# Patient Record
Sex: FEMALE | Race: WHITE | NOT HISPANIC OR LATINO | Employment: FULL TIME | RURAL
[De-identification: names, ages, dates, MRNs, and addresses within clinical notes are randomized per-mention and may not be internally consistent; named-entity substitution may affect disease eponyms.]

---

## 2022-05-21 DIAGNOSIS — F51.01 PRIMARY INSOMNIA: Primary | ICD-10-CM

## 2022-05-23 RX ORDER — ZOLPIDEM TARTRATE 10 MG/1
TABLET ORAL
Qty: 30 TABLET | Refills: 2 | Status: SHIPPED | OUTPATIENT
Start: 2022-05-23 | End: 2022-10-31 | Stop reason: SDUPTHER

## 2022-05-23 NOTE — TELEPHONE ENCOUNTER
Rx Refill Note  Requested Prescriptions     Pending Prescriptions Disp Refills    zolpidem (AMBIEN) 10 MG tablet [Pharmacy Med Name: ZOLPIDEM 10MG TABLETS] 30 tablet      Sig: TAKE 1 TABLET BY MOUTH EVERY DAY AT BEDTIME      Last office visit with prescribing clinician: 3/14/22      Next office visit with prescribing clinician: Visit date not found            Gogo Dejesus MA  05/23/22, 11:27 EDT

## 2022-10-24 ENCOUNTER — TELEMEDICINE (OUTPATIENT)
Dept: FAMILY MEDICINE CLINIC | Facility: CLINIC | Age: 52
End: 2022-10-24

## 2022-10-24 VITALS — DIASTOLIC BLOOD PRESSURE: 84 MMHG | SYSTOLIC BLOOD PRESSURE: 130 MMHG

## 2022-10-24 DIAGNOSIS — Z79.899 HIGH RISK MEDICATION USE: ICD-10-CM

## 2022-10-24 DIAGNOSIS — F51.01 PRIMARY INSOMNIA: Primary | ICD-10-CM

## 2022-10-24 PROCEDURE — 99213 OFFICE O/P EST LOW 20 MIN: CPT | Performed by: PHYSICIAN ASSISTANT

## 2022-10-24 NOTE — PROGRESS NOTES
Chief Complaint  Med Refill (Ambien refills )    Subjective         Jc Little presents to [facility]  History of Present Illness  Visit done today through [email or web address], patient unable to access [employer]. Visit done with patient permission. She is in today for refills on Ambien- states does not have to take every evening. Denies side effects of medication. She states is utd on gyn exam, mammogram, colonoscopy and fasting labs. She states has been feeling well. Denies any chest pain or shortness of breath.   Insomnia  This is a chronic problem. Pertinent negatives include no abdominal pain, congestion, fatigue, headaches, myalgias, nausea, sore throat or vomiting.     Review of Systems   Constitutional: Negative for fatigue.   HENT: Negative for congestion and sore throat.    Respiratory: Negative for shortness of breath.    Gastrointestinal: Negative for abdominal pain, diarrhea, nausea and vomiting.   Musculoskeletal: Negative for myalgias.   Neurological: Negative for dizziness and headache.   Psychiatric/Behavioral: The patient has insomnia.      Objective   Vital Signs:   /84     Physical Exam   Constitutional: She appears well-developed and well-nourished.   Pulmonary/Chest: Effort normal.   Psychiatric: She has a normal mood and affect.     Result Review :                 Assessment and Plan    Diagnoses and all orders for this visit:    1. Primary insomnia (Primary)  Discussed limitation of virtual visit. Patient to come by for urine drug screen and will send medication refill to her regular PCP for review. Ari today shows compliance. RTC prior to recheck as needed.   2. High risk medication use  -     POC Urine Drug Screen, Confirmation        Follow Up   No follow-ups on file.  Patient was given instructions and counseling regarding her condition or for health maintenance advice. Please see specific information pulled into the AVS if appropriate.     Mode of Visit:  Video  Location of patient: home  Location of provider: [facility] clinic  You have chosen to receive care through a telehealth visit.  Does the patient consent to use a video/audio connection for your medical care today? Yes  The visit included audio and video interaction. No technical issues occurred during this visit.

## 2022-10-27 ENCOUNTER — CLINICAL SUPPORT (OUTPATIENT)
Dept: FAMILY MEDICINE CLINIC | Facility: CLINIC | Age: 52
End: 2022-10-27

## 2022-10-27 ENCOUNTER — TELEPHONE (OUTPATIENT)
Dept: FAMILY MEDICINE CLINIC | Facility: CLINIC | Age: 52
End: 2022-10-27

## 2022-10-27 DIAGNOSIS — Z79.899 HIGH RISK MEDICATION USE: ICD-10-CM

## 2022-10-27 LAB
POC AMPHETAMINES: NEGATIVE
POC BARBITURATES: NEGATIVE
POC BENZODIAZEPHINES: NEGATIVE
POC COCAINE: NEGATIVE
POC METHADONE: NEGATIVE
POC METHAMPHETAMINE SCREEN URINE: NEGATIVE
POC OPIATES: NEGATIVE
POC OXYCODONE: NEGATIVE
POC PHENCYCLIDINE: NEGATIVE
POC PROPOXYPHENE: NEGATIVE
POC THC: NEGATIVE
POC TRICYCLIC ANTIDEPRESSANTS: NEGATIVE

## 2022-10-27 PROCEDURE — 80305 DRUG TEST PRSMV DIR OPT OBS: CPT | Performed by: PHYSICIAN ASSISTANT

## 2022-10-27 NOTE — TELEPHONE ENCOUNTER
Patient seen on 10/24/2022 for  telehealth visit and [facility] UDS was negative. Ari printed. Thanks.

## 2022-10-31 ENCOUNTER — TELEPHONE (OUTPATIENT)
Dept: FAMILY MEDICINE CLINIC | Facility: CLINIC | Age: 52
End: 2022-10-31

## 2022-10-31 DIAGNOSIS — F51.01 PRIMARY INSOMNIA: ICD-10-CM

## 2022-10-31 RX ORDER — ZOLPIDEM TARTRATE 10 MG/1
10 TABLET ORAL
Qty: 30 TABLET | Refills: 2 | Status: SHIPPED | OUTPATIENT
Start: 2022-10-31 | End: 2023-02-08

## 2022-10-31 RX ORDER — ZOLPIDEM TARTRATE 10 MG/1
10 TABLET ORAL
Qty: 30 TABLET | Refills: 2 | Status: SHIPPED | OUTPATIENT
Start: 2022-10-31 | End: 2022-10-31 | Stop reason: SDUPTHER

## 2022-11-01 NOTE — TELEPHONE ENCOUNTER
Rx Refill Note  Requested Prescriptions     Pending Prescriptions Disp Refills    zolpidem (AMBIEN) 10 MG tablet [Pharmacy Med Name: ZOLPIDEM 10MG TABLETS] 30 tablet      Sig: TAKE 1 TABLET BY MOUTH EVERY DAY AT BEDTIME      Last office visit with prescribing clinician: Visit date not found      Next office visit with prescribing clinician: Visit date not found            Gogo Dejesus MA  11/01/22, 16:21 EDT

## 2022-11-02 RX ORDER — ZOLPIDEM TARTRATE 10 MG/1
TABLET ORAL
Qty: 30 TABLET | Refills: 0 | Status: SHIPPED | OUTPATIENT
Start: 2022-11-02 | End: 2023-03-13

## 2023-02-08 ENCOUNTER — OFFICE VISIT (OUTPATIENT)
Dept: FAMILY MEDICINE CLINIC | Facility: CLINIC | Age: 53
End: 2023-02-08
Payer: COMMERCIAL

## 2023-02-08 VITALS
WEIGHT: 222.19 LBS | BODY MASS INDEX: 34.87 KG/M2 | DIASTOLIC BLOOD PRESSURE: 82 MMHG | HEART RATE: 79 BPM | SYSTOLIC BLOOD PRESSURE: 120 MMHG | TEMPERATURE: 98.1 F | HEIGHT: 67 IN | OXYGEN SATURATION: 98 %

## 2023-02-08 DIAGNOSIS — M79.605 LEFT LEG PAIN: Primary | ICD-10-CM

## 2023-02-08 PROCEDURE — 99213 OFFICE O/P EST LOW 20 MIN: CPT | Performed by: NURSE PRACTITIONER

## 2023-02-08 RX ORDER — CEPHALEXIN 500 MG/1
500 CAPSULE ORAL 3 TIMES DAILY
Qty: 30 CAPSULE | Refills: 0 | Status: SHIPPED | OUTPATIENT
Start: 2023-02-08

## 2023-02-08 NOTE — ASSESSMENT & PLAN NOTE
Patient will return this afternoon for x-ray as our x-ray tech is not currently here.  Informed her to call afterwards for results.  Informed her that the cut has likely become infected which is causing the redness and the pain in the area so we will treat with a course of Keflex.  Keep clean with soap and water.  Tylenol and ibuprofen as needed pain fever.  Education provided.  Risk of meds discussed understood.  Go to ED if worsens.  Return in 2 days if no improvement, to ED if worsens.  Return to clinic or ED with any issues or concerns.

## 2023-02-08 NOTE — PROGRESS NOTES
"Chief Complaint  Left Leg injury    Subjective          Jc Little presents to [facility]  History of Present Illness     Patient states 5 days ago she was stepping up on her deck and tripped and hit her left shin on the corner of a wooden stair.  States there are 3 cuts, bruising present, and an area that is reddened and warm and tender.  No discharge.  No fever no chills no body aches.  No calf pain no leg swelling.  No shortness of breath no trouble breathing no chest pain no chest pressure.  States she had a tetanus shot 2 years ago.    Objective   Vital Signs:   /82   Pulse 79   Temp 98.1 °F (36.7 °C)   Ht 170.2 cm (67\")   Wt 101 kg (222 lb 3 oz)   SpO2 98%   BMI 34.80 kg/m²     Body mass index is 34.8 kg/m².    Review of Systems   Constitutional: Negative for chills, fatigue and fever.   Respiratory: Negative for cough, shortness of breath and wheezing.    Cardiovascular: Negative for leg swelling.   Skin: Positive for wound.   Neurological: Negative for dizziness, light-headedness and headache.       Past History:  Medical History: has a past medical history of Diverticulitis (2007) and Pregnancy.   Surgical History: has no past surgical history on file.   Family History: family history includes Alzheimer's disease in an other family member; Cancer in an other family member; Diabetes in an other family member; Hypertension in her mother; Obesity in her mother; Peripheral vascular disease in her mother.   Social History: reports that she has quit smoking. Her smoking use included cigarettes. She has never used smokeless tobacco. She reports current alcohol use of about 10.0 standard drinks per week. She reports that she does not use drugs.    PHQ-2 Depression Screening  Little interest or pleasure in doing things? 0-->not at all   Feeling down, depressed, or hopeless? 0-->not at all   PHQ-2 Total Score 0        PHQ-9 Depression Screening  Little interest or pleasure " in doing things? 0-->not at all   Feeling down, depressed, or hopeless? 0-->not at all   Trouble falling or staying asleep, or sleeping too much?     Feeling tired or having little energy?     Poor appetite or overeating?     Feeling bad about yourself - or that you are a failure or have let yourself or your family down?     Trouble concentrating on things, such as reading the newspaper or watching television?     Moving or speaking so slowly that other people could have noticed? Or the opposite - being so fidgety or restless that you have been moving around a lot more than usual?     Thoughts that you would be better off dead, or of hurting yourself in some way?     PHQ-9 Total Score 0   If you checked off any problems, how difficult have these problems made it for you to do your work, take care of things at home, or get along with other people?       PHQ-9 Total Score: 0      Patient screened positive for depression based on a PHQ-9 score of 0 on 2/8/2023. Follow-up recommendations include:       Current Outpatient Medications:   •  zolpidem (AMBIEN) 10 MG tablet, TAKE 1 TABLET BY MOUTH EVERY DAY AT BEDTIME, Disp: 30 tablet, Rfl: 0  •  cephalexin (Keflex) 500 MG capsule, Take 1 capsule by mouth 3 (Three) Times a Day., Disp: 30 capsule, Rfl: 0   (Not in a hospital admission)     Allergies: Patient has no known allergies.    Physical Exam  Constitutional:       Appearance: Normal appearance.   Cardiovascular:      Rate and Rhythm: Normal rate and regular rhythm.      Heart sounds: Normal heart sounds.   Pulmonary:      Effort: Pulmonary effort is normal.      Breath sounds: Normal breath sounds.   Musculoskeletal:         General: No swelling.      Right lower leg: Laceration present. No edema.      Left lower leg: No edema.        Legs:    Skin:         Neurological:      General: No focal deficit present.      Mental Status: She is alert and oriented to person, place, and time. Mental status is at baseline.    Psychiatric:         Mood and Affect: Mood normal.         Behavior: Behavior normal.         Thought Content: Thought content normal.         Judgment: Judgment normal.          Result Review :                   Assessment and Plan    Diagnoses and all orders for this visit:    1. Left leg pain (Primary)  Assessment & Plan:  Patient will return this afternoon for x-ray as our x-ray tech is not currently here.  Informed her to call afterwards for results.  Informed her that the cut has likely become infected which is causing the redness and the pain in the area so we will treat with a course of Keflex.  Keep clean with soap and water.  Tylenol and ibuprofen as needed pain fever.  Education provided.  Risk of meds discussed understood.  Go to ED if worsens.  Return in 2 days if no improvement, to ED if worsens.  Return to clinic or ED with any issues or concerns.    Orders:  -     XR Tibia Fibula 2 View Left (In Office)  -     cephalexin (Keflex) 500 MG capsule; Take 1 capsule by mouth 3 (Three) Times a Day.  Dispense: 30 capsule; Refill: 0            BMI is >= 30 and <35. (Class 1 Obesity). The following options were offered after discussion;: exercise counseling/recommendations and nutrition counseling/recommendations       Follow Up   Return if symptoms worsen or fail to improve.  Patient was given instructions and counseling regarding her condition or for health maintenance advice. Please see specific information pulled into the AVS if appropriate.     FATOU Swift

## 2023-02-09 ENCOUNTER — TELEPHONE (OUTPATIENT)
Dept: FAMILY MEDICINE CLINIC | Facility: CLINIC | Age: 53
End: 2023-02-09
Payer: COMMERCIAL

## 2023-02-09 NOTE — TELEPHONE ENCOUNTER
Please let patient know that from what I can see on the x-ray it looks unremarkable, no fracture.  I am still waiting for the official report though to come back from the radiologist so I will keep her updated.  Thanks

## 2023-02-09 NOTE — TELEPHONE ENCOUNTER
Caller: Jc Little    Relationship: Self    Best call back number: [phone]    Caller requesting test results: YES    What test was performed: X-RAY    When was the test performed: 2.8.23    Where was the test performed: IN OFFICE    Additional notes: PLEASE CALL TO DISCUSS THESE RESULTS AS SOON AS POSSIBLE.     THANK YOU.

## 2023-02-13 DIAGNOSIS — M79.605 LEFT LEG PAIN: Primary | ICD-10-CM

## 2023-02-17 ENCOUNTER — TELEPHONE (OUTPATIENT)
Dept: FAMILY MEDICINE CLINIC | Facility: CLINIC | Age: 53
End: 2023-02-17
Payer: COMMERCIAL

## 2023-02-17 NOTE — TELEPHONE ENCOUNTER
Caller: Jc Little    Relationship: Self    Best call back number: [phone]    What medication are you requesting: AN ADDITIONAL ANTIBIOTIC     What are your current symptoms: INFECTION IN LEFT LEG FROM FALL, STILL RED,SWOLLEN AND HOT     How long have you been experiencing symptoms: 2 WEEKS    Have you had these symptoms before:    [x] Yes  [] No    Have you been treated for these symptoms before:   [x] Yes  [] No    If a prescription is needed, what is your preferred pharmacy and phone number: [facility][id number] [address] AT [facility] & [facility] - [phone] [facility] [phone]      Additional notes:  THE PATIENT REPORTS THAT SHE WILL BE OUT OF HER CURRENT ANTIBOTICS FOR THE INFECTION IN HER LEG AS OF TOMORROW (02/18/2023), AND STATES IT HAS NOT GOTTEN ANY BETTER AND PERHAPS HAS GOTTEN WORSE. THE PATIENT REPORTS SHE HAS AN UPCOMING APPOINTMENT WITH ORTHO ON Tuesday, 02/21/2023.    PLEASE CALL THE PATIENT AND ADVISE

## 2023-02-18 NOTE — TELEPHONE ENCOUNTER
Called pt and let her know we should make appt to reevaluate. She declines appt. Will call back if it gets worse.

## 2023-02-22 ENCOUNTER — TELEPHONE (OUTPATIENT)
Dept: FAMILY MEDICINE CLINIC | Facility: CLINIC | Age: 53
End: 2023-02-22
Payer: COMMERCIAL

## 2023-02-22 NOTE — TELEPHONE ENCOUNTER
Caller: Jc Bautista    Relationship: Self    Best call back number: [phone]    What form or medical record are you requesting: XRAY RESULTS DISK    Who is requesting this form or medical record from you: [facility]    How would you like to receive the form or medical records (pick-up, mail, fax): PT WILL LIKE TO HAVE DISK PICKED UP FROM OFFICE. PT WILL LIKE FOR   DISK: CELSO BAUTISTA    Timeframe paperwork needed: ASAP, APPT WITH [facility] IS Friday 2/24/2023

## 2023-03-10 DIAGNOSIS — F51.01 PRIMARY INSOMNIA: ICD-10-CM

## 2023-03-13 RX ORDER — ZOLPIDEM TARTRATE 10 MG/1
TABLET ORAL
Qty: 30 TABLET | Refills: 0 | Status: SHIPPED | OUTPATIENT
Start: 2023-03-13

## 2023-03-13 NOTE — TELEPHONE ENCOUNTER
Caller: Jc Little    Relationship: Self    Best call back number: [phone]    Requested Prescriptions:   Requested Prescriptions     Pending Prescriptions Disp Refills   • zolpidem (AMBIEN) 10 MG tablet [Pharmacy Med Name: ZOLPIDEM 10MG TABLETS] 30 tablet      Sig: TAKE 1 TABLET BY MOUTH EVERY DAY AT BEDTIME        Pharmacy where request should be sent: [facility] #[id number] [address] AT [facility] & [facility] [phone] [facility] [phone] [facility]       Does the patient have less than a 3 day supply:  [x] Yes  [] No    Would you like a call back once the refill request has been completed: [x] Yes [] No    If the office needs to give you a call back, can they leave a voicemail: [x] Yes [] No    Drea Jameson Rep   03/13/23 13:59 EDT

## 2023-04-11 ENCOUNTER — OFFICE VISIT (OUTPATIENT)
Dept: FAMILY MEDICINE CLINIC | Facility: CLINIC | Age: 53
End: 2023-04-11
Payer: COMMERCIAL

## 2023-04-11 VITALS
BODY MASS INDEX: 34.69 KG/M2 | WEIGHT: 221 LBS | HEART RATE: 75 BPM | SYSTOLIC BLOOD PRESSURE: 118 MMHG | DIASTOLIC BLOOD PRESSURE: 80 MMHG | HEIGHT: 67 IN | OXYGEN SATURATION: 99 %

## 2023-04-11 DIAGNOSIS — M25.50 ARTHRALGIA, UNSPECIFIED JOINT: Primary | ICD-10-CM

## 2023-04-11 DIAGNOSIS — F51.01 PRIMARY INSOMNIA: ICD-10-CM

## 2023-04-11 RX ORDER — MELOXICAM 7.5 MG/1
7.5 TABLET ORAL DAILY
Qty: 30 TABLET | Refills: 2 | Status: SHIPPED | OUTPATIENT
Start: 2023-04-11

## 2023-04-11 RX ORDER — ZOLPIDEM TARTRATE 10 MG/1
10 TABLET ORAL
Qty: 30 TABLET | Refills: 5 | Status: SHIPPED | OUTPATIENT
Start: 2023-04-11

## 2023-04-12 NOTE — PROGRESS NOTES
Follow Up Office Visit      Date of Visit:  2023   Patient Name: Jc Little  : 1970   MRN: [id number]     Chief Complaint:    Chief Complaint   Patient presents with   • Insomnia       History of Present Illness: Jc Little is a 52 y.o. female who is here today for follow up.  Patient coming in to review recheck insomnia.  On Ambien.  Controlled.  Ari report appropriate.  Patient also with some arthralgias that she is struggling with.        Subjective      Review of Systems:   Review of Systems   Constitutional: Negative for fatigue and fever.   HENT: Negative for congestion and ear pain.    Respiratory: Negative for apnea, cough, chest tightness and shortness of breath.    Cardiovascular: Negative for chest pain.   Gastrointestinal: Negative for abdominal pain, constipation, diarrhea and nausea.   Musculoskeletal: Negative for arthralgias.   Psychiatric/Behavioral: Negative for depressed mood and stress.       Past Medical History:   Past Medical History:   Diagnosis Date   • Diverticulitis     DX'ED BY ER MD THOUGH TOLD CT WAS OK   • Pregnancy        Past Surgical History: No past surgical history on file.    Family History:   Family History   Problem Relation Age of Onset   • Hypertension Mother    • Obesity Mother    • Peripheral vascular disease Mother    • Alzheimer's disease Other    • Cancer Other    • Diabetes Other        Social History:   Social History     Socioeconomic History   • Marital status:    Tobacco Use   • Smoking status: Former     Years: 10.00     Types: Cigarettes   • Smokeless tobacco: Never   Vaping Use   • Vaping Use: Never used   Substance and Sexual Activity   • Alcohol use: Yes     Alcohol/week: 10.0 standard drinks     Types: 10 Cans of beer per week   • Drug use: Never   • Sexual activity: Defer       Medications:     Current Outpatient Medications:   •  zolpidem (AMBIEN) 10 MG tablet, Take 1 tablet by mouth every night at bedtime., Disp: 30  "tablet, Rfl: 5  •  meloxicam (Mobic) 7.5 MG tablet, Take 1 tablet by mouth Daily., Disp: 30 tablet, Rfl: 2    Allergies:   No Known Allergies    Objective     Physical Exam:  Vital Signs:   Vitals:    04/11/23 1420   BP: 118/80   Pulse: 75   SpO2: 99%   Weight: 100 kg (221 lb)   Height: 170.2 cm (67\")     Body mass index is 34.61 kg/m².     Physical Exam  Vitals and nursing note reviewed.   Constitutional:       General: She is not in acute distress.     Appearance: Normal appearance. She is not ill-appearing.   HENT:      Head: Normocephalic and atraumatic.      Right Ear: Tympanic membrane and ear canal normal.      Left Ear: Tympanic membrane and ear canal normal.      Nose: Nose normal.   Cardiovascular:      Rate and Rhythm: Normal rate and regular rhythm.      Heart sounds: Normal heart sounds.   Pulmonary:      Effort: Pulmonary effort is normal.      Breath sounds: Normal breath sounds.   Neurological:      Mental Status: She is alert and oriented to person, place, and time. Mental status is at baseline.   Psychiatric:         Mood and Affect: Mood normal.         Procedures      Assessment / Plan      Assessment/Plan:   Diagnoses and all orders for this visit:    1. Arthralgia, unspecified joint (Primary)    2. Primary insomnia  -     zolpidem (AMBIEN) 10 MG tablet; Take 1 tablet by mouth every night at bedtime.  Dispense: 30 tablet; Refill: 5    Other orders  -     meloxicam (Mobic) 7.5 MG tablet; Take 1 tablet by mouth Daily.  Dispense: 30 tablet; Refill: 2         Refill medication for insomnia.  Trial of meloxicam for some arthralgias.    Follow Up:   No follow-ups on file.    [facility] [place]   "

## 2023-09-29 ENCOUNTER — OFFICE VISIT (OUTPATIENT)
Dept: FAMILY MEDICINE CLINIC | Facility: CLINIC | Age: 53
End: 2023-09-29
Payer: COMMERCIAL

## 2023-09-29 VITALS
DIASTOLIC BLOOD PRESSURE: 80 MMHG | SYSTOLIC BLOOD PRESSURE: 112 MMHG | HEIGHT: 67 IN | BODY MASS INDEX: 33.12 KG/M2 | HEART RATE: 59 BPM | OXYGEN SATURATION: 100 % | WEIGHT: 211 LBS

## 2023-09-29 DIAGNOSIS — M25.561 CHRONIC PAIN OF BOTH KNEES: ICD-10-CM

## 2023-09-29 DIAGNOSIS — M25.562 CHRONIC PAIN OF BOTH KNEES: ICD-10-CM

## 2023-09-29 DIAGNOSIS — M25.511 ACUTE PAIN OF RIGHT SHOULDER: Primary | ICD-10-CM

## 2023-09-29 DIAGNOSIS — G89.29 CHRONIC PAIN OF BOTH KNEES: ICD-10-CM

## 2023-09-29 PROCEDURE — 99213 OFFICE O/P EST LOW 20 MIN: CPT | Performed by: PHYSICIAN ASSISTANT

## 2023-09-29 NOTE — PROGRESS NOTES
"Chief Complaint  Knee Pain (Both knees having pain in them for couple months ) and Shoulder Pain (Right shoulder pain for couple months )    Subjective          Jc Little presents to [facility]  History of Present Illness  Patient in today for evaluation on bilateral knee pain that she states has been ongoing for several months but seems to be getting worse, right knee worse than left. States gets stiffness and fullness feeling to knees. She also states around 2 months ago woke up with pain to right shoulder- no known injury, but has limited her ROM at times and is modifying how she does things and wanted to get checked. She tries not to take nsaids due to history of gastric bypass surgery.   Knee Pain   The incident occurred more than 1 week ago. There was no injury mechanism. The pain is present in the left knee and right knee. The quality of the pain is described as aching.     Objective   Vital Signs:   /80   Pulse 59   Ht 170.2 cm (67\")   Wt 95.7 kg (211 lb)   SpO2 100%   BMI 33.05 kg/m²     Body mass index is 33.05 kg/m².    Review of Systems   Constitutional:  Negative for fever.   Musculoskeletal:  Positive for arthralgias and joint swelling.     Past History:  Medical History: has a past medical history of Diverticulitis (2007) and Pregnancy.   Surgical History: has no past surgical history on file.   Family History: family history includes Alzheimer's disease in an other family member; Cancer in an other family member; Diabetes in an other family member; Hypertension in her mother; Obesity in her mother; Peripheral vascular disease in her mother.   Social History: reports that she has quit smoking. Her smoking use included cigarettes. She has never used smokeless tobacco. She reports current alcohol use of about 10.0 standard drinks per week. She reports that she does not use drugs.      Current Outpatient Medications:     zolpidem (AMBIEN) 10 MG tablet, Take 1 " tablet by mouth every night at bedtime., Disp: 30 tablet, Rfl: 5    meloxicam (Mobic) 7.5 MG tablet, Take 1 tablet by mouth Daily., Disp: 30 tablet, Rfl: 2  Allergies: Patient has no known allergies.    Physical Exam  Constitutional:       Appearance: Normal appearance.   Musculoskeletal:      Comments: FROM of bilateral knee; mild tenderness to palpate bilaterally; FROM of right shoulder; tenderness noted with adduction of shoulder; able to reach behind back    Neurological:      Mental Status: She is alert and oriented to person, place, and time.   Psychiatric:         Mood and Affect: Mood normal.         Behavior: Behavior normal.           Assessment and Plan   Diagnoses and all orders for this visit:    1. Acute pain of right shoulder (Primary)  -     XR Shoulder 2+ View Right (In Office)  Will check xray of shoulder and discussed can set up with physical therapy for consult and/or orthopedist if needed.   2. Chronic pain of both knees  -     XR Knee 1 or 2 View Bilateral (In Office)  Will check xrays of knees today and discussed can get in with ortho if needed.           Follow Up   No follow-ups on file.  Patient was given instructions and counseling regarding her condition or for health maintenance advice. Please see specific information pulled into the AVS if appropriate.     Ciera Casillas PA-C

## 2023-10-05 DIAGNOSIS — G89.29 CHRONIC PAIN OF RIGHT KNEE: Primary | ICD-10-CM

## 2023-10-05 DIAGNOSIS — M25.561 CHRONIC PAIN OF RIGHT KNEE: Primary | ICD-10-CM

## 2023-10-12 ENCOUNTER — OFFICE VISIT (OUTPATIENT)
Dept: FAMILY MEDICINE CLINIC | Facility: CLINIC | Age: 53
End: 2023-10-12
Payer: COMMERCIAL

## 2023-10-12 VITALS
WEIGHT: 209 LBS | HEART RATE: 72 BPM | HEIGHT: 67 IN | SYSTOLIC BLOOD PRESSURE: 118 MMHG | DIASTOLIC BLOOD PRESSURE: 80 MMHG | OXYGEN SATURATION: 98 % | BODY MASS INDEX: 32.8 KG/M2

## 2023-10-12 DIAGNOSIS — J02.9 SORE THROAT: Primary | ICD-10-CM

## 2023-10-12 DIAGNOSIS — H65.01 RIGHT ACUTE SEROUS OTITIS MEDIA, RECURRENCE NOT SPECIFIED: ICD-10-CM

## 2023-10-12 LAB
EXPIRATION DATE: NORMAL
EXPIRATION DATE: NORMAL
FLUAV AG UPPER RESP QL IA.RAPID: NOT DETECTED
FLUBV AG UPPER RESP QL IA.RAPID: NOT DETECTED
INTERNAL CONTROL: NORMAL
INTERNAL CONTROL: NORMAL
Lab: NORMAL
Lab: NORMAL
S PYO AG THROAT QL: NEGATIVE
SARS-COV-2 AG UPPER RESP QL IA.RAPID: NOT DETECTED

## 2023-10-12 PROCEDURE — 87880 STREP A ASSAY W/OPTIC: CPT | Performed by: FAMILY MEDICINE

## 2023-10-12 PROCEDURE — 99213 OFFICE O/P EST LOW 20 MIN: CPT | Performed by: FAMILY MEDICINE

## 2023-10-12 PROCEDURE — 87428 SARSCOV & INF VIR A&B AG IA: CPT | Performed by: FAMILY MEDICINE

## 2023-10-12 RX ORDER — FEXOFENADINE HCL 180 MG/1
180 TABLET ORAL DAILY
Qty: 30 TABLET | Refills: 11 | Status: SHIPPED | OUTPATIENT
Start: 2023-10-12

## 2023-10-12 RX ORDER — AMOXICILLIN 500 MG/1
1000 CAPSULE ORAL 2 TIMES DAILY
Qty: 40 CAPSULE | Refills: 0 | Status: SHIPPED | OUTPATIENT
Start: 2023-10-12

## 2023-10-12 RX ORDER — FLUTICASONE PROPIONATE 50 MCG
2 SPRAY, SUSPENSION (ML) NASAL DAILY
Qty: 16 G | Refills: 11 | Status: SHIPPED | OUTPATIENT
Start: 2023-10-12

## 2023-10-12 NOTE — PROGRESS NOTES
Office Note     Name: Jc Little    : 1970     MRN: [id number]     Chief Complaint  Sinusitis (With ear ache and chills and sore throat. )    Subjective     History of Present Illness:  Jc Little is a 53 y.o. female who presents today for with 2 days of earache and chills and sore throat.  She gets 1 infection sometime during the year.  She seems congested especially when she lays down at night total blockage.    Review of Systems:   Review of Systems    Past Medical History:   Past Medical History:   Diagnosis Date    Diverticulitis     DX'ED BY ER MD THOUGH TOLD CT WAS OK    Pregnancy        Past Surgical History: History reviewed. No pertinent surgical history.    Family History:   Family History   Problem Relation Age of Onset    Hypertension Mother     Obesity Mother     Peripheral vascular disease Mother     Alzheimer's disease Other     Cancer Other     Diabetes Other        Social History:   Social History     Socioeconomic History    Marital status:    Tobacco Use    Smoking status: Former     Years: 10     Types: Cigarettes    Smokeless tobacco: Never   Vaping Use    Vaping Use: Never used   Substance and Sexual Activity    Alcohol use: Yes     Alcohol/week: 10.0 standard drinks of alcohol     Types: 10 Cans of beer per week    Drug use: Never    Sexual activity: Defer       Immunizations:   Immunization History   Administered Date(s) Administered    COVID-19 (MODERNA) 1st,2nd,3rd Dose Monovalent 2021, 2021    Hepatitis A 2018, 2019        Medications:     Current Outpatient Medications:     zolpidem (AMBIEN) 10 MG tablet, Take 1 tablet by mouth every night at bedtime., Disp: 30 tablet, Rfl: 5    amoxicillin (AMOXIL) 500 MG capsule, Take 2 capsules by mouth 2 (Two) Times a Day., Disp: 40 capsule, Rfl: 0    fexofenadine (Allegra Allergy) 180 MG tablet, Take 1 tablet by mouth Daily., Disp: 30 tablet, Rfl: 11    fluticasone (FLONASE) 50 MCG/ACT nasal spray,  "2 sprays into the nostril(s) as directed by provider Daily., Disp: 16 g, Rfl: 11    Allergies:   No Known Allergies    Objective     Vital Signs  /80   Pulse 72   Ht 170.2 cm (67.01\")   Wt 94.8 kg (209 lb)   SpO2 98%   BMI 32.73 kg/mý   Estimated body mass index is 32.73 kg/mý as calculated from the following:    Height as of this encounter: 170.2 cm (67.01\").    Weight as of this encounter: 94.8 kg (209 lb).            Physical Exam  Vitals and nursing note reviewed.   HENT:      Head: Normocephalic and atraumatic.      Comments: Rt tm 3+ redness, bulging>     Right Ear: Ear canal and external ear normal.      Left Ear: Tympanic membrane, ear canal and external ear normal.      Nose: No congestion or rhinorrhea.      Mouth/Throat:      Mouth: Mucous membranes are moist. Mucous membranes are dry.   Eyes:      Extraocular Movements: Extraocular movements intact.      Conjunctiva/sclera: Conjunctivae normal.      Pupils: Pupils are equal, round, and reactive to light.   Cardiovascular:      Rate and Rhythm: Normal rate and regular rhythm.   Pulmonary:      Effort: Pulmonary effort is normal.      Breath sounds: Normal breath sounds.   Lymphadenopathy:      Cervical: No cervical adenopathy.   Skin:     General: Skin is warm and dry.   Neurological:      General: No focal deficit present.      Mental Status: She is alert.        Procedures     Assessment and Plan     1. Sore throat  Rapid strep negative Abdi and COVID-19 negative, influenza A influenza B negative  - POCT SARS-CoV-2 Antigen VANNESSA  - POC Rapid Strep A    2. Right acute serous otitis media, recurrence not specified  Amoxil high-dose Flonase and left leg are recommended       Follow Up  No follow-ups on file.    Lauri BACK [facility]  [address] [facility] [address] [phone]  "

## 2023-11-28 DIAGNOSIS — F51.01 PRIMARY INSOMNIA: ICD-10-CM

## 2023-11-28 NOTE — TELEPHONE ENCOUNTER
Caller: Jc Little    Relationship: Self    Best call back number: [phone]     Requested Prescriptions:   Requested Prescriptions     Pending Prescriptions Disp Refills    zolpidem (AMBIEN) 10 MG tablet [Pharmacy Med Name: ZOLPIDEM 10MG TABLETS] 30 tablet      Sig: TAKE 1 TABLET BY MOUTH EVERY NIGHT AT BEDTIME        Pharmacy where request should be sent: [id number] [place] [address] AT [facility] [address] [facility] [phone] [facility] [phone]      Last office visit with prescribing clinician: 4/11/2023   Last telemedicine visit with prescribing clinician: Visit date not found   Next office visit with prescribing clinician: Visit date not found     Additional details provided by patient: HAS 2 PILLS LEFT.    Does the patient have less than a 3 day supply:  [x] Yes  [] No    Would you like a call back once the refill request has been completed: [x] Yes [] No    If the office needs to give you a call back, can they leave a voicemail: [] Yes [] No    Drea Porter Rep   11/28/23 11:27 EST

## 2023-11-29 RX ORDER — ZOLPIDEM TARTRATE 10 MG/1
10 TABLET ORAL
Qty: 30 TABLET | OUTPATIENT
Start: 2023-11-29

## 2023-11-30 NOTE — TELEPHONE ENCOUNTER
PATIENT ICALLED BACK TO CHECK THE STATUS OF THIS MEDICATION REQUEST. I SCHEDULED HER ON 12/06/23 AT 8:15AM. CAN YOU PLEASE SEND IN A WEEKS WORTH OF AMBIEN TO GET HER THROUGH UNTIL THEN. CANNOT SLEEP WITHOUT IT SOMETIMES.  PLEASE SEND TO [facility] IN [place]. THANK YOU.

## 2023-12-06 ENCOUNTER — TELEPHONE (OUTPATIENT)
Dept: FAMILY MEDICINE CLINIC | Facility: CLINIC | Age: 53
End: 2023-12-06
Payer: COMMERCIAL

## 2023-12-06 ENCOUNTER — OFFICE VISIT (OUTPATIENT)
Dept: FAMILY MEDICINE CLINIC | Facility: CLINIC | Age: 53
End: 2023-12-06
Payer: COMMERCIAL

## 2023-12-06 VITALS
WEIGHT: 209 LBS | DIASTOLIC BLOOD PRESSURE: 82 MMHG | HEART RATE: 62 BPM | SYSTOLIC BLOOD PRESSURE: 118 MMHG | HEIGHT: 67 IN | BODY MASS INDEX: 32.8 KG/M2 | OXYGEN SATURATION: 96 %

## 2023-12-06 DIAGNOSIS — Z79.899 ENCOUNTER FOR LONG-TERM (CURRENT) USE OF OTHER MEDICATIONS: ICD-10-CM

## 2023-12-06 DIAGNOSIS — F51.01 PRIMARY INSOMNIA: ICD-10-CM

## 2023-12-06 DIAGNOSIS — F51.01 PRIMARY INSOMNIA: Primary | ICD-10-CM

## 2023-12-06 PROCEDURE — 99213 OFFICE O/P EST LOW 20 MIN: CPT | Performed by: PHYSICIAN ASSISTANT

## 2023-12-06 PROCEDURE — 80305 DRUG TEST PRSMV DIR OPT OBS: CPT | Performed by: PHYSICIAN ASSISTANT

## 2023-12-06 RX ORDER — ZOLPIDEM TARTRATE 10 MG/1
10 TABLET ORAL
Qty: 30 TABLET | Refills: 5 | Status: SHIPPED | OUTPATIENT
Start: 2023-12-06

## 2023-12-06 NOTE — PROGRESS NOTES
"Chief Complaint  Insomnia (Ambien refills. )    Subjective          Jc Little presents to [facility]  History of Present Illness  Patient in today for medication refill on Ambien for her insomnia. She states does not have to take every night  and sometimes only takes half a pill. She denies any side effects of medications. She is utd on gyn exam and mammogram at this time. She is utd on colonoscopy. She states gets labs through her gyn annually and will bring in a copy of next labs.   Insomnia  This is a chronic problem. Pertinent negatives include no abdominal pain, chest pain, congestion, coughing, fever, myalgias, nausea, sore throat or vomiting.       Objective   Vital Signs:   /82   Pulse 62   Ht 170.2 cm (67\")   Wt 94.8 kg (209 lb)   SpO2 96%   BMI 32.73 kg/m²     Body mass index is 32.73 kg/m².    Review of Systems   Constitutional:  Negative for fever.   HENT:  Negative for congestion and sore throat.    Respiratory:  Negative for cough and shortness of breath.    Cardiovascular:  Negative for chest pain.   Gastrointestinal:  Negative for abdominal pain, diarrhea, nausea and vomiting.   Musculoskeletal:  Negative for myalgias.   Neurological:  Negative for dizziness and headache.   Psychiatric/Behavioral:  Positive for sleep disturbance. Negative for depressed mood. The patient has insomnia. The patient is not nervous/anxious.        Past History:  Medical History: has a past medical history of Colon polyp (August 2022), Diverticulitis (2007), and Pregnancy.   Surgical History: has a past surgical history that includes Bariatric Surgery (October 2011); Eye surgery (1998); and Colonoscopy (December 2023).   Family History: family history includes Alzheimer's disease in an other family member; Cancer in an other family member; Diabetes in an other family member; Hypertension in her mother; Obesity in her mother; Peripheral vascular disease in her mother.   Social " History: reports that she quit smoking about 27 years ago. Her smoking use included cigarettes. She started smoking about 36 years ago. She has never used smokeless tobacco. She reports current alcohol use of about 10.0 standard drinks of alcohol per week. She reports that she does not use drugs.      Current Outpatient Medications:     fexofenadine (Allegra Allergy) 180 MG tablet, Take 1 tablet by mouth Daily., Disp: 30 tablet, Rfl: 11    Multiple Vitamins-Minerals (HM MULTIVITAMIN ADULT GUMMY PO), Multivitamin  Bowling Miguel Quinn, Disp: , Rfl:     Probiotic Product (PROBIOTIC DAILY PO), Probiotic  Bowling Miguel Quinn, Disp: , Rfl:     zolpidem (AMBIEN) 10 MG tablet, Take 1 tablet by mouth every night at bedtime., Disp: 30 tablet, Rfl: 5  Allergies: Patient has no known allergies.    Physical Exam        Assessment and Plan   Diagnoses and all orders for this visit:    1. Primary insomnia (Primary)  Patient states ambien continues to work well for her insomnia. Denies side effects of medication. UDS today was negative. Ari for compliance and will send medication request to her regular PCP for approval. RTC prior to next f/up as needed.   2. Encounter for long-term (current) use of other medications  -     POC Urine Drug Screen Premier [facility]Cup            Follow Up   No follow-ups on file.  Patient was given instructions and counseling regarding her condition or for health maintenance advice. Please see specific information pulled into the AVS if appropriate.     Ciera Casillas PA-C

## 2024-06-03 ENCOUNTER — OFFICE VISIT (OUTPATIENT)
Dept: FAMILY MEDICINE CLINIC | Facility: CLINIC | Age: 54
End: 2024-06-03
Payer: COMMERCIAL

## 2024-06-03 VITALS
SYSTOLIC BLOOD PRESSURE: 116 MMHG | WEIGHT: 211.9 LBS | OXYGEN SATURATION: 96 % | HEART RATE: 65 BPM | DIASTOLIC BLOOD PRESSURE: 78 MMHG | HEIGHT: 67 IN | BODY MASS INDEX: 33.26 KG/M2

## 2024-06-03 DIAGNOSIS — M19.91 PRIMARY OSTEOARTHRITIS, UNSPECIFIED SITE: ICD-10-CM

## 2024-06-03 DIAGNOSIS — Z12.31 ENCOUNTER FOR SCREENING MAMMOGRAM FOR MALIGNANT NEOPLASM OF BREAST: ICD-10-CM

## 2024-06-03 DIAGNOSIS — F51.01 PRIMARY INSOMNIA: Primary | ICD-10-CM

## 2024-06-03 PROCEDURE — 99214 OFFICE O/P EST MOD 30 MIN: CPT | Performed by: FAMILY MEDICINE

## 2024-06-03 RX ORDER — ZOLPIDEM TARTRATE 12.5 MG/1
12.5 TABLET, FILM COATED, EXTENDED RELEASE ORAL NIGHTLY PRN
Qty: 30 TABLET | Refills: 5 | Status: SHIPPED | OUTPATIENT
Start: 2024-06-03

## 2024-06-03 RX ORDER — MELOXICAM 7.5 MG/1
7.5 TABLET ORAL DAILY
Qty: 30 TABLET | Refills: 2 | Status: SHIPPED | OUTPATIENT
Start: 2024-06-03

## 2024-06-03 NOTE — PROGRESS NOTES
Follow Up Office Visit      Date of Visit:  2024   Patient Name: Jc Little  : 1970   MRN: [id number]     Chief Complaint:    Chief Complaint   Patient presents with    Insomnia     Pt is here for a medication recheck on insomnia.        History of Present Illness: Jc Little is a 53 y.o. female who is here today for follow up.  Patient here for recheck on insomnia.  Insomnia not as controlled on current Ambien.  Patient also with some complaints of osteoarthritis.  Cannot take large quantities of NSAIDs due to prior gastric surgery.  Patient also is due for screening mammogram.        Subjective      Review of Systems:   Review of Systems   Constitutional:  Negative for fatigue and fever.   HENT:  Negative for congestion and ear pain.    Respiratory:  Negative for apnea, cough, chest tightness and shortness of breath.    Cardiovascular:  Negative for chest pain.   Gastrointestinal:  Negative for abdominal pain, constipation, diarrhea and nausea.   Musculoskeletal:  Positive for arthralgias.   Psychiatric/Behavioral:  Negative for depressed mood and stress.        Past Medical History:   Past Medical History:   Diagnosis Date    Colon polyp 2022    Diverticulitis     DX'ED BY ER MD THOUGH TOLD CT WAS OK    Pregnancy        Past Surgical History:   Past Surgical History:   Procedure Laterality Date    BARIATRIC SURGERY  2011    COLONOSCOPY  2023    EYE SURGERY         Family History:   Family History   Problem Relation Age of Onset    Hypertension Mother     Obesity Mother     Peripheral vascular disease Mother     Alzheimer's disease Other     Cancer Other     Diabetes Other        Social History:   Social History     Socioeconomic History    Marital status:    Tobacco Use    Smoking status: Former     Current packs/day: 0.00     Types: Cigarettes     Start date: 1987     Quit date: 1996     Years since quittin.1    Smokeless tobacco: Never  "  Vaping Use    Vaping status: Never Used   Substance and Sexual Activity    Alcohol use: Yes     Alcohol/week: 10.0 standard drinks of alcohol     Types: 10 Cans of beer per week    Drug use: Never    Sexual activity: Yes     Partners: Male     Birth control/protection: None       Medications:     Current Outpatient Medications:     fexofenadine (Allegra Allergy) 180 MG tablet, Take 1 tablet by mouth Daily., Disp: 30 tablet, Rfl: 11    meloxicam (Mobic) 7.5 MG tablet, Take 1 tablet by mouth Daily., Disp: 30 tablet, Rfl: 2    Multiple Vitamins-Minerals (HM MULTIVITAMIN ADULT GUMMY PO), Multivitamin  Bowling Miguel Quinn (Patient not taking: Reported on 6/3/2024), Disp: , Rfl:     Probiotic Product (PROBIOTIC DAILY PO), Probiotic  Christen Quinn (Patient not taking: Reported on 6/3/2024), Disp: , Rfl:     zolpidem CR (Ambien CR) 12.5 MG CR tablet, Take 1 tablet by mouth At Night As Needed for Sleep., Disp: 30 tablet, Rfl: 5    Allergies:   No Known Allergies    Objective     Physical Exam:  Vital Signs:   Vitals:    06/03/24 0826   BP: 116/78   Pulse: 65   SpO2: 96%   Weight: 96.1 kg (211 lb 14.4 oz)   Height: 170.2 cm (67\")     Body mass index is 33.19 kg/m².     Physical Exam  Vitals and nursing note reviewed.   Constitutional:       General: She is not in acute distress.     Appearance: Normal appearance. She is not ill-appearing.   HENT:      Head: Normocephalic and atraumatic.      Right Ear: Tympanic membrane and ear canal normal.      Left Ear: Tympanic membrane and ear canal normal.      Nose: Nose normal.   Cardiovascular:      Rate and Rhythm: Normal rate and regular rhythm.      Heart sounds: Normal heart sounds.   Pulmonary:      Effort: Pulmonary effort is normal.      Breath sounds: Normal breath sounds.   Neurological:      Mental Status: She is alert and oriented to person, place, and time. Mental status is at baseline.   Psychiatric:         Mood and Affect: Mood normal. "         Procedures      Assessment / Plan      Assessment/Plan:   Diagnoses and all orders for this visit:    1. Primary insomnia (Primary)  -     zolpidem CR (Ambien CR) 12.5 MG CR tablet; Take 1 tablet by mouth At Night As Needed for Sleep.  Dispense: 30 tablet; Refill: 5    2. Encounter for screening mammogram for malignant neoplasm of breast  -     Mammo Screening Digital Tomosynthesis Bilateral With CAD; Future    3. Primary osteoarthritis, unspecified site    Other orders  -     meloxicam (Mobic) 7.5 MG tablet; Take 1 tablet by mouth Daily.  Dispense: 30 tablet; Refill: 2         Will change current Ambien prescription to the controlled release version.  Arrange mammogram.  Trial of occasional meloxicam.  She will take with Pepcid when she takes it for some stomach protection.    Follow Up:   No follow-ups on file.    Gilles Briones  [facility] [place]

## 2024-07-21 DIAGNOSIS — F51.01 PRIMARY INSOMNIA: ICD-10-CM

## 2024-07-22 RX ORDER — ZOLPIDEM TARTRATE 10 MG/1
10 TABLET ORAL
Qty: 30 TABLET | Refills: 2 | Status: SHIPPED | OUTPATIENT
Start: 2024-07-22

## 2024-09-03 ENCOUNTER — OFFICE VISIT (OUTPATIENT)
Dept: FAMILY MEDICINE CLINIC | Facility: CLINIC | Age: 54
End: 2024-09-03
Payer: COMMERCIAL

## 2024-09-03 VITALS
DIASTOLIC BLOOD PRESSURE: 76 MMHG | SYSTOLIC BLOOD PRESSURE: 140 MMHG | OXYGEN SATURATION: 98 % | WEIGHT: 219 LBS | HEIGHT: 67 IN | BODY MASS INDEX: 34.37 KG/M2 | HEART RATE: 83 BPM

## 2024-09-03 DIAGNOSIS — Z79.899 HIGH RISK MEDICATION USE: ICD-10-CM

## 2024-09-03 DIAGNOSIS — M19.91 PRIMARY OSTEOARTHRITIS, UNSPECIFIED SITE: ICD-10-CM

## 2024-09-03 DIAGNOSIS — Z00.00 ROUTINE GENERAL MEDICAL EXAMINATION AT A HEALTH CARE FACILITY: ICD-10-CM

## 2024-09-03 DIAGNOSIS — F51.01 PRIMARY INSOMNIA: Primary | ICD-10-CM

## 2024-09-03 PROCEDURE — 99214 OFFICE O/P EST MOD 30 MIN: CPT | Performed by: FAMILY MEDICINE

## 2024-09-03 RX ORDER — DICLOFENAC SODIUM 30 MG/G
GEL TOPICAL 2 TIMES DAILY
Qty: 100 G | Refills: 1 | Status: SHIPPED | OUTPATIENT
Start: 2024-09-03

## 2024-09-03 NOTE — PROGRESS NOTES
Follow Up Office Visit      Date of Visit:  2024   Patient Name: Jc Little  : 1970   MRN: [id number]     Chief Complaint:    Chief Complaint   Patient presents with    Insomnia       History of Present Illness: Jc Little is a 53 y.o. female who is here today for follow up.    History of Present Illness  The patient is a 53-year-old female here for a recheck on her insomnia.    She reports no issues with her current medication, Ambien CR, which she has been using for several weeks.    She experiences severe knee pain, which she manages with diclofenac gel applied twice daily and an over-the-counter 1 percent arthritis pain cream. The pain was absent yesterday but can be intense at times, especially when she stands for extended periods. She also takes meloxicam as needed, with the last dose taken over the past weekend and the one before that in 2024.    She had blood work done in 2024, which showed a decrease in her A1c levels, indicating she is no longer prediabetic. She has noticed a slight increase in her blood pressure, which is typically low.    She takes probiotics daily and is considering adding vitamins to her regimen.      Subjective      Review of Systems:   Review of Systems   Constitutional:  Negative for fatigue and fever.   HENT:  Negative for congestion and ear pain.    Respiratory:  Negative for apnea, cough, chest tightness and shortness of breath.    Cardiovascular:  Negative for chest pain.   Gastrointestinal:  Negative for abdominal pain, constipation, diarrhea and nausea.   Musculoskeletal:  Positive for arthralgias.   Psychiatric/Behavioral:  Negative for depressed mood and stress.        Past Medical History:   Past Medical History:   Diagnosis Date    Colon polyp 2022    Diverticulitis     DX'ED BY ER MD THOUGH TOLD CT WAS OK    Pregnancy        Past Surgical History:   Past Surgical History:   Procedure Laterality Date    BARIATRIC SURGERY   "2011    COLONOSCOPY  2023    EYE SURGERY         Family History:   Family History   Problem Relation Age of Onset    Hypertension Mother     Obesity Mother     Peripheral vascular disease Mother     Alzheimer's disease Other     Cancer Other     Diabetes Other        Social History:   Social History     Socioeconomic History    Marital status:    Tobacco Use    Smoking status: Former     Current packs/day: 0.00     Types: Cigarettes     Start date: 1987     Quit date: 1996     Years since quittin.3    Smokeless tobacco: Never   Vaping Use    Vaping status: Never Used   Substance and Sexual Activity    Alcohol use: Yes     Alcohol/week: 10.0 standard drinks of alcohol     Types: 10 Cans of beer per week    Drug use: Never    Sexual activity: Yes     Partners: Male     Birth control/protection: None       Medications:     Current Outpatient Medications:     fexofenadine (Allegra Allergy) 180 MG tablet, Take 1 tablet by mouth Daily., Disp: 30 tablet, Rfl: 11    meloxicam (Mobic) 7.5 MG tablet, Take 1 tablet by mouth Daily., Disp: 30 tablet, Rfl: 2    zolpidem CR (Ambien CR) 12.5 MG CR tablet, Take 1 tablet by mouth At Night As Needed for Sleep., Disp: 30 tablet, Rfl: 5    Diclofenac Sodium 3 % gel gel, Apply  topically to the appropriate area as directed 2 (Two) Times a Day. for 60 days., Disp: 100 g, Rfl: 1    Multiple Vitamins-Minerals (HM MULTIVITAMIN ADULT GUMMY PO), Multivitamin  Bowling Miguel Quinn (Patient not taking: Reported on 6/3/2024), Disp: , Rfl:     Probiotic Product (PROBIOTIC DAILY PO), Probiotic  Goyoling Miguel Quinn (Patient not taking: Reported on 6/3/2024), Disp: , Rfl:     Allergies:   No Known Allergies    Objective     Physical Exam:  Vital Signs:   Vitals:    24 0846   BP: 140/76   Pulse: 83   SpO2: 98%   Weight: 99.3 kg (219 lb)   Height: 170.2 cm (67\")     Body mass index is 34.3 kg/m².     Physical Exam  Vitals and nursing note reviewed. "   Constitutional:       General: She is not in acute distress.     Appearance: Normal appearance. She is not ill-appearing.   HENT:      Head: Normocephalic and atraumatic.      Right Ear: Tympanic membrane and ear canal normal.      Left Ear: Tympanic membrane and ear canal normal.      Nose: Nose normal.   Cardiovascular:      Rate and Rhythm: Normal rate and regular rhythm.      Heart sounds: Normal heart sounds.   Pulmonary:      Effort: Pulmonary effort is normal.      Breath sounds: Normal breath sounds.   Neurological:      Mental Status: She is alert and oriented to person, place, and time. Mental status is at baseline.   Psychiatric:         Mood and Affect: Mood normal.       Physical Exam  Vital Signs  Blood pressure reading is 140/76.    Procedures    Results  Laboratory Studies  A1c came down, no longer prediabetic.  Assessment / Plan      Assessment/Plan:   Diagnoses and all orders for this visit:    1. Primary insomnia (Primary)    2. Primary osteoarthritis, unspecified site    3. Routine general medical examination at a health care facility  -     CBC & Differential  -     Comprehensive Metabolic Panel  -     Lipid Panel  -     TSH    4. High risk medication use  -     CBC & Differential  -     Comprehensive Metabolic Panel  -     Lipid Panel  -     TSH    Other orders  -     Diclofenac Sodium 3 % gel gel; Apply  topically to the appropriate area as directed 2 (Two) Times a Day. for 60 days.  Dispense: 100 g; Refill: 1       Assessment & Plan  1. Insomnia.  Her insomnia is being managed with Ambien CR, which she has been taking for several weeks. She prefers the controlled-release version over the regular one. The current prescription for Ambien CR has refills available until December 2024.     2. Knee Pain.  She uses meloxicam as needed for knee pain, with the last dose taken this past weekend. She also uses over-the-counter 1% arthritis pain cream, which she finds effective. [facility]. She was advised that the gel will not affect her kidneys or liver.    3. Elevated Blood Pressure.  She has noticed a slight increase in her blood pressure, which is typically low. She was advised to monitor her blood pressure at home using a cuff and to check it at random times to get more accurate readings.    4. Health Maintenance.  Blood work was last done in February 2024, showing a decrease in A1c levels, indicating she is no longer prediabetic. She was advised to have blood work done every six months to monitor kidney function due to her use of anti-inflammatory medication. Lab orders were provided for blood work today. She takes probiotics daily and was advised that either gummy or pill form vitamins are acceptable.    Follow-up  She will follow up in 3 months.        Follow Up:   No follow-ups on file.    Gilles Briones  [facility] [place]     Patient or patient representative verbalized consent for the use of Ambient Listening during the visit with  Gilles Briones MD for chart documentation. 9/3/2024  10:52 EDT

## 2024-09-04 LAB
ALBUMIN SERPL-MCNC: 4.4 G/DL (ref 3.8–4.9)
ALP SERPL-CCNC: 93 IU/L (ref 44–121)
ALT SERPL-CCNC: 17 IU/L (ref 0–32)
AST SERPL-CCNC: 22 IU/L (ref 0–40)
BASOPHILS # BLD AUTO: 0 X10E3/UL (ref 0–0.2)
BASOPHILS NFR BLD AUTO: 1 %
BILIRUB SERPL-MCNC: 1 MG/DL (ref 0–1.2)
BUN SERPL-MCNC: 13 MG/DL (ref 6–24)
BUN/CREAT SERPL: 16 (ref 9–23)
CALCIUM SERPL-MCNC: 9.2 MG/DL (ref 8.7–10.2)
CHLORIDE SERPL-SCNC: 104 MMOL/L (ref 96–106)
CHOLEST SERPL-MCNC: 193 MG/DL (ref 100–199)
CO2 SERPL-SCNC: 21 MMOL/L (ref 20–29)
CREAT SERPL-MCNC: 0.83 MG/DL (ref 0.57–1)
EGFRCR SERPLBLD CKD-EPI 2021: 85 ML/MIN/1.73
EOSINOPHIL # BLD AUTO: 0.1 X10E3/UL (ref 0–0.4)
EOSINOPHIL NFR BLD AUTO: 2 %
ERYTHROCYTE [DISTWIDTH] IN BLOOD BY AUTOMATED COUNT: 13.2 % (ref 11.7–15.4)
GLOBULIN SER CALC-MCNC: 2.7 G/DL (ref 1.5–4.5)
GLUCOSE SERPL-MCNC: 96 MG/DL (ref 70–99)
HCT VFR BLD AUTO: 40.7 % (ref 34–46.6)
HDLC SERPL-MCNC: 87 MG/DL
HGB BLD-MCNC: 13.1 G/DL (ref 11.1–15.9)
IMM GRANULOCYTES # BLD AUTO: 0 X10E3/UL (ref 0–0.1)
IMM GRANULOCYTES NFR BLD AUTO: 1 %
LDLC SERPL CALC-MCNC: 89 MG/DL (ref 0–99)
LYMPHOCYTES # BLD AUTO: 1 X10E3/UL (ref 0.7–3.1)
LYMPHOCYTES NFR BLD AUTO: 28 %
MCH RBC QN AUTO: 30.8 PG (ref 26.6–33)
MCHC RBC AUTO-ENTMCNC: 32.2 G/DL (ref 31.5–35.7)
MCV RBC AUTO: 96 FL (ref 79–97)
MONOCYTES # BLD AUTO: 0.5 X10E3/UL (ref 0.1–0.9)
MONOCYTES NFR BLD AUTO: 15 %
NEUTROPHILS # BLD AUTO: 1.9 X10E3/UL (ref 1.4–7)
NEUTROPHILS NFR BLD AUTO: 53 %
PLATELET # BLD AUTO: 200 X10E3/UL (ref 150–450)
POTASSIUM SERPL-SCNC: 4.5 MMOL/L (ref 3.5–5.2)
PROT SERPL-MCNC: 7.1 G/DL (ref 6–8.5)
RBC # BLD AUTO: 4.26 X10E6/UL (ref 3.77–5.28)
SODIUM SERPL-SCNC: 138 MMOL/L (ref 134–144)
TRIGL SERPL-MCNC: 94 MG/DL (ref 0–149)
TSH SERPL DL<=0.005 MIU/L-ACNC: 3.89 UIU/ML (ref 0.45–4.5)
VLDLC SERPL CALC-MCNC: 17 MG/DL (ref 5–40)
WBC # BLD AUTO: 3.6 X10E3/UL (ref 3.4–10.8)

## 2024-10-13 DIAGNOSIS — F51.01 PRIMARY INSOMNIA: ICD-10-CM

## 2024-10-14 RX ORDER — ZOLPIDEM TARTRATE 10 MG/1
10 TABLET ORAL
Qty: 30 TABLET | OUTPATIENT
Start: 2024-10-14

## 2024-12-03 ENCOUNTER — OFFICE VISIT (OUTPATIENT)
Dept: FAMILY MEDICINE CLINIC | Facility: CLINIC | Age: 54
End: 2024-12-03
Payer: COMMERCIAL

## 2024-12-03 VITALS
BODY MASS INDEX: 35 KG/M2 | OXYGEN SATURATION: 100 % | DIASTOLIC BLOOD PRESSURE: 80 MMHG | HEART RATE: 76 BPM | WEIGHT: 223 LBS | HEIGHT: 67 IN | SYSTOLIC BLOOD PRESSURE: 124 MMHG

## 2024-12-03 DIAGNOSIS — L25.9 CONTACT DERMATITIS, UNSPECIFIED CONTACT DERMATITIS TYPE, UNSPECIFIED TRIGGER: ICD-10-CM

## 2024-12-03 DIAGNOSIS — F51.01 PRIMARY INSOMNIA: Primary | ICD-10-CM

## 2024-12-03 PROCEDURE — 99213 OFFICE O/P EST LOW 20 MIN: CPT | Performed by: FAMILY MEDICINE

## 2024-12-03 PROCEDURE — 90750 HZV VACC RECOMBINANT IM: CPT | Performed by: FAMILY MEDICINE

## 2024-12-03 PROCEDURE — 90471 IMMUNIZATION ADMIN: CPT | Performed by: FAMILY MEDICINE

## 2024-12-03 RX ORDER — ZOLPIDEM TARTRATE 12.5 MG/1
12.5 TABLET, FILM COATED, EXTENDED RELEASE ORAL NIGHTLY PRN
Qty: 30 TABLET | Refills: 5 | Status: SHIPPED | OUTPATIENT
Start: 2024-12-03

## 2024-12-03 RX ORDER — TRIAMCINOLONE ACETONIDE 1 MG/G
1 CREAM TOPICAL 2 TIMES DAILY
Qty: 30 G | Refills: 0 | Status: SHIPPED | OUTPATIENT
Start: 2024-12-03

## 2024-12-03 NOTE — LETTER
[facility]  Vaccine Consent Form    Patient Name:  Jc Little  Patient :  1970     Vaccine(s) Ordered    Shingrix Vaccine        Screening Checklist  The following questions should be completed prior to vaccination. If you answer “yes” to any question, it does not necessarily mean you should not be vaccinated. It just means we may need to clarify or ask more questions. If a question is unclear, please ask your healthcare provider to explain it.    Yes No   Any fever or moderate to severe illness today (mild illness and/or antibiotic treatment are not contraindications)?     Do you have a history of a serious reaction to any previous vaccinations, such as anaphylaxis, encephalopathy within 7 days, Guillain-[place] syndrome within 6 weeks, seizure?     Have you received any live vaccine(s) (e.g MMR, MAGDALENA) or any other vaccines in the last month (to ensure duplicate doses aren't given)?     Do you have an anaphylactic allergy to latex (DTaP, DTaP-IPV, Hep A, Hep B, MenB, RV, Td, Tdap), baker’s yeast (Hep B, HPV), polysorbates (RSV, nirsevimab, PCV 20, Rotavirrus, Tdap, Shingrix), or gelatin (MAGDALENA, MMR)?     Do you have an anaphylactic allergy to neomycin (Rabies, MAGDALENA, MMR, IPV, Hep A), polymyxin B (IPV), or streptomycin (IPV)?      Any cancer, leukemia, AIDS, or other immune system disorder? (MAGDALENA, MMR, RV)     Do you have a parent, brother, or sister with an immune system problem (if immune competence of vaccine recipient clinically verified, can proceed)? (MMR, MAGDALENA)     Any recent steroid treatments for >2 weeks, chemotherapy, or radiation treatment? (MAGDALENA, MMR)     Have you received antibody-containing blood transfusions or IVIG in the past 11 months (recommended interval is dependent on product)? (MMR, MAGDALENA)     Have you taken antiviral drugs (acyclovir, famciclovir, valacyclovir for MAGDALENA) in the last 24 or 48 hours, respectively?      Are you pregnant or planning to become pregnant within 1 month? (MAGDALENA, MMR,  "HPV, IPV, MenB, Abrexvy; For Hep B- refer to Engerix-B; For RSV - Abrysvo is indicated for 32-36 weeks of pregnancy from September to January)     For infants, have you ever been told your child has had intussusception or a medical emergency involving obstruction of the intestine (Rotavirus)? If not for an infant, can skip this question.         *Ordering Physicians/APC should be consulted if \"yes\" is checked by the patient or guardian above.  I have received, read, and understand the Vaccine Information Statement (VIS) for each vaccine ordered.  I have considered my or my child's health status as well as the health status of my close contacts.  I have taken the opportunity to discuss my vaccine questions with my or my child's health care provider.   I have requested that the ordered vaccine(s) be given to me or my child.  I understand the benefits and risks of the vaccines.  I understand that I should remain in the clinic for 15 minutes after receiving the vaccine(s).  _________________________________________________________  Signature of Patient or Parent/Legal Guardian ____________________  Date   "

## 2024-12-03 NOTE — PROGRESS NOTES
Follow Up Office Visit      Date of Visit:  2024   Patient Name: Jc Little  : 1970   MRN: [id number]     Chief Complaint:    Chief Complaint   Patient presents with    Insomnia    DISCUSS SHINGLES VACCINE       History of Present Illness: Jc Little is a 54 y.o. female who is here today for follow up.    History of Present Illness  The patient is a 54-year-old female here for recheck on her current medications.    She has expressed interest in receiving a shingles vaccine. She has an upcoming appointment with Debbie Barahona, a weight loss specialist, and is considering semaglutide injections as part of her weight loss strategy. Her daily regimen includes a probiotic, a multivitamin, and Metamucil. She has been prescribed diclofenac cream for arthritis, but her insurance has denied coverage. She has noticed some raised bumps that cause mild itching. She has discontinued the use of meloxicam.    IMMUNIZATIONS  She has had 1 influenza vaccine.      Subjective      Review of Systems:   Review of Systems   Constitutional:  Negative for fatigue and fever.   HENT:  Negative for congestion and ear pain.    Respiratory:  Negative for apnea, cough, chest tightness and shortness of breath.    Cardiovascular:  Negative for chest pain.   Gastrointestinal:  Negative for abdominal pain, constipation, diarrhea and nausea.   Musculoskeletal:  Negative for arthralgias.   Skin:  Positive for rash.   Psychiatric/Behavioral:  Negative for depressed mood and stress.        Past Medical History:   Past Medical History:   Diagnosis Date    Colon polyp 2022    Diverticulitis     DX'ED BY ER MD THOUGH TOLD CT WAS OK    Pregnancy        Past Surgical History:   Past Surgical History:   Procedure Laterality Date    BARIATRIC SURGERY  2011    COLONOSCOPY  2023    EYE SURGERY         Family History:   Family History   Problem Relation Age of Onset    Hypertension Mother     Obesity Mother   "   Peripheral vascular disease Mother     Alzheimer's disease Other     Cancer Other     Diabetes Other        Social History:   Social History     Socioeconomic History    Marital status:    Tobacco Use    Smoking status: Former     Current packs/day: 0.00     Types: Cigarettes     Start date: 1987     Quit date: 1996     Years since quittin.6    Smokeless tobacco: Never   Vaping Use    Vaping status: Never Used   Substance and Sexual Activity    Alcohol use: Yes     Alcohol/week: 10.0 standard drinks of alcohol     Types: 10 Cans of beer per week    Drug use: Never    Sexual activity: Yes     Partners: Male     Birth control/protection: None       Medications:     Current Outpatient Medications:     fexofenadine (Allegra Allergy) 180 MG tablet, Take 1 tablet by mouth Daily., Disp: 30 tablet, Rfl: 11    Multiple Vitamins-Minerals (HM MULTIVITAMIN ADULT GUMMY PO), , Disp: , Rfl:     Probiotic Product (PROBIOTIC DAILY PO), , Disp: , Rfl:     zolpidem CR (Ambien CR) 12.5 MG CR tablet, Take 1 tablet by mouth At Night As Needed for Sleep., Disp: 30 tablet, Rfl: 5    Diclofenac Sodium 3 % gel gel, Apply  topically to the appropriate area as directed 2 (Two) Times a Day. for 60 days. (Patient not taking: Reported on 12/3/2024), Disp: 100 g, Rfl: 1    meloxicam (Mobic) 7.5 MG tablet, Take 1 tablet by mouth Daily. (Patient not taking: Reported on 12/3/2024), Disp: 30 tablet, Rfl: 2    triamcinolone (KENALOG) 0.1 % cream, Apply 1 Application topically to the appropriate area as directed 2 (Two) Times a Day., Disp: 30 g, Rfl: 0    Allergies:   No Known Allergies    Objective     Physical Exam:  Vital Signs:   Vitals:    24 0851   BP: 124/80   Pulse: 76   SpO2: 100%   Weight: 101 kg (223 lb)   Height: 170.2 cm (67\")     Body mass index is 34.93 kg/m².     Physical Exam  Vitals and nursing note reviewed.   Constitutional:       General: She is not in acute distress.     Appearance: Normal appearance. " She is not ill-appearing.   HENT:      Head: Normocephalic and atraumatic.      Right Ear: Tympanic membrane and ear canal normal.      Left Ear: Tympanic membrane and ear canal normal.      Nose: Nose normal.   Cardiovascular:      Rate and Rhythm: Normal rate and regular rhythm.      Heart sounds: Normal heart sounds.   Pulmonary:      Effort: Pulmonary effort is normal.      Breath sounds: Normal breath sounds.   Neurological:      Mental Status: She is alert and oriented to person, place, and time. Mental status is at baseline.   Psychiatric:         Mood and Affect: Mood normal.       Physical Exam      Procedures    Results    Assessment / Plan      Assessment/Plan:   Diagnoses and all orders for this visit:    1. Encounter for screening mammogram for malignant neoplasm of breast (Primary)    2. Primary insomnia  -     zolpidem CR (Ambien CR) 12.5 MG CR tablet; Take 1 tablet by mouth At Night As Needed for Sleep.  Dispense: 30 tablet; Refill: 5    Other orders  -     triamcinolone (KENALOG) 0.1 % cream; Apply 1 Application topically to the appropriate area as directed 2 (Two) Times a Day.  Dispense: 30 g; Refill: 0  -     Shingrix Vaccine       Assessment & Plan  1. Medication review.  A prescription for triamcinolone cream was provided for the raised bumps on her neck, which appear to be a sensitivity reaction. She was advised to use it for any future skin reactions, including poison ivy or insect stings. A refill for Ambien was also provided and sent to [facility].    2. Shingles prevention.  The shingles vaccine was administered today. She was informed that the vaccine consists of two shots, with the second dose to be administered in 3 months. She was advised to take Tylenol tonight and tomorrow morning to mitigate any potential achiness from the vaccine.    3. Weight management.  She discussed starting semaglutide for weight loss with another provider. She was informed that semaglutide is a good option for  weight loss, with minimal interference with her current medications and the shingles vaccine. She was advised to maintain bowel health with probiotics and possibly a stool softener if needed.    4. Arthritis management.  Her insurance denied the prescription for diclofenac 3% cream. She was informed that over-the-counter options are available but at a lower strength (1%). The office will attempt to get prior authorization for the 3% cream.            Follow Up:   No follow-ups on file.    Gilles Briones  [facility] [place]     Patient or patient representative verbalized consent for the use of Ambient Listening during the visit with  Gilles Briones MD for chart documentation. 12/3/2024  09:34 EST

## 2024-12-22 DIAGNOSIS — F51.01 PRIMARY INSOMNIA: ICD-10-CM

## 2024-12-23 RX ORDER — ZOLPIDEM TARTRATE 12.5 MG/1
12.5 TABLET, FILM COATED, EXTENDED RELEASE ORAL NIGHTLY PRN
Qty: 30 TABLET | OUTPATIENT
Start: 2024-12-23

## 2024-12-26 ENCOUNTER — TELEPHONE (OUTPATIENT)
Dept: FAMILY MEDICINE CLINIC | Facility: CLINIC | Age: 54
End: 2024-12-26
Payer: COMMERCIAL

## 2024-12-27 DIAGNOSIS — F51.01 PRIMARY INSOMNIA: ICD-10-CM

## 2024-12-27 RX ORDER — ZOLPIDEM TARTRATE 12.5 MG/1
12.5 TABLET, FILM COATED, EXTENDED RELEASE ORAL NIGHTLY PRN
Qty: 30 TABLET | Refills: 5 | Status: SHIPPED | OUTPATIENT
Start: 2024-12-27 | End: 2024-12-27

## 2024-12-27 RX ORDER — ZOLPIDEM TARTRATE 12.5 MG/1
12.5 TABLET, FILM COATED, EXTENDED RELEASE ORAL NIGHTLY PRN
Qty: 30 TABLET | Refills: 5 | Status: SHIPPED | OUTPATIENT
Start: 2024-12-27

## 2025-03-04 ENCOUNTER — OFFICE VISIT (OUTPATIENT)
Dept: FAMILY MEDICINE CLINIC | Facility: CLINIC | Age: 55
End: 2025-03-04
Payer: COMMERCIAL

## 2025-03-04 VITALS
OXYGEN SATURATION: 97 % | WEIGHT: 223 LBS | DIASTOLIC BLOOD PRESSURE: 68 MMHG | HEIGHT: 67 IN | BODY MASS INDEX: 35 KG/M2 | SYSTOLIC BLOOD PRESSURE: 120 MMHG | HEART RATE: 69 BPM

## 2025-03-04 DIAGNOSIS — F51.01 PRIMARY INSOMNIA: ICD-10-CM

## 2025-03-04 DIAGNOSIS — Z00.00 ROUTINE GENERAL MEDICAL EXAMINATION AT A HEALTH CARE FACILITY: Primary | ICD-10-CM

## 2025-03-04 DIAGNOSIS — Z76.89 ENCOUNTER FOR WEIGHT MANAGEMENT: ICD-10-CM

## 2025-03-04 PROCEDURE — 99396 PREV VISIT EST AGE 40-64: CPT | Performed by: FAMILY MEDICINE

## 2025-03-04 PROCEDURE — 90750 HZV VACC RECOMBINANT IM: CPT | Performed by: FAMILY MEDICINE

## 2025-03-04 PROCEDURE — 90471 IMMUNIZATION ADMIN: CPT | Performed by: FAMILY MEDICINE

## 2025-03-04 NOTE — PROGRESS NOTES
Female Physical Note      Date: 2025   Patient Name: Jc Little  : 1970   MRN: [id number]     Chief Complaint:    Chief Complaint   Patient presents with    Annual Exam       History of Present Illness: Jc Little is a 54 y.o. female who is here today for their annual health maintenance and physical.   History of Present Illness  The patient presents for a routine checkup.    She has been utilizing over-the-counter diclofenac gel at a concentration of 1 percent, as her insurance did not cover the prescribed 30 percent formulation. She has been adhering to the recommendation of concurrent use of Pepcid to mitigate potential gastric irritation.    She initiated semaglutide therapy in the last week of 2025 and has since experienced a weight loss of 2.7 pounds over a 5-week period. She reports no adverse effects from the medication. She is currently on a regimen of Allegra for allergy symptoms, Ambien for sleep, daily Metamucil, probiotic, and multivitamin supplements. She maintains adequate hydration.    She has not undergone a hysterectomy and is uncertain about the date of her last Pap smear. Her gynecologist is Dr. Louise Puentes. She had a mammogram last month. She had a colon cancer screening in . She had a tetanus shot in 2019.    MEDICATIONS  Current: semaglutide, Allegra, Ambien, Metamucil, probiotic, multivitamin    IMMUNIZATIONS  She received a tetanus shot in 2019.    Subjective      Review of Systems:   Review of Systems    Past Medical History:   Past Medical History:   Diagnosis Date    Colon polyp 2022    Diverticulitis     DX'ED BY ER MD THOUGH TOLD CT WAS OK    Pregnancy        Past Surgical History:   Past Surgical History:   Procedure Laterality Date    BARIATRIC SURGERY  2011    COLONOSCOPY  2023    EYE SURGERY         Family History:   Family History   Problem Relation Age of Onset    Hypertension Mother     Obesity Mother      Peripheral vascular disease Mother     Alzheimer's disease Other     Cancer Other     Diabetes Other        Social History:   Social History     Socioeconomic History    Marital status:    Tobacco Use    Smoking status: Former     Current packs/day: 0.00     Types: Cigarettes     Start date: 1987     Quit date: 1996     Years since quittin.8    Smokeless tobacco: Never   Vaping Use    Vaping status: Never Used   Substance and Sexual Activity    Alcohol use: Yes     Alcohol/week: 10.0 standard drinks of alcohol     Types: 10 Cans of beer per week    Drug use: Never    Sexual activity: Yes     Partners: Male     Birth control/protection: None       Medications:     Current Outpatient Medications:     Diclofenac Sodium 3 % gel gel, Apply  topically to the appropriate area as directed 2 (Two) Times a Day. for 60 days. (Patient not taking: Reported on 12/3/2024), Disp: 100 g, Rfl: 1    fexofenadine (Allegra Allergy) 180 MG tablet, Take 1 tablet by mouth Daily., Disp: 30 tablet, Rfl: 11    meloxicam (Mobic) 7.5 MG tablet, Take 1 tablet by mouth Daily. (Patient not taking: Reported on 12/3/2024), Disp: 30 tablet, Rfl: 2    Multiple Vitamins-Minerals (HM MULTIVITAMIN ADULT GUMMY PO), , Disp: , Rfl:     Probiotic Product (PROBIOTIC DAILY PO), , Disp: , Rfl:     triamcinolone (KENALOG) 0.1 % cream, Apply 1 Application topically to the appropriate area as directed 2 (Two) Times a Day., Disp: 30 g, Rfl: 0    zolpidem CR (Ambien CR) 12.5 MG CR tablet, Take 1 tablet by mouth At Night As Needed for Sleep., Disp: 30 tablet, Rfl: 5    Allergies:   No Known Allergies    Immunization History   Administered Date(s) Administered    COVID-19 (MODERNA) 1st,2nd,3rd Dose Monovalent 2021, 2021    Fluzone (or Fluarix & Flulaval for VFC) >6mos 2023    Hepatitis A 2018, 2019    Influenza, Unspecified 10/25/2023    Shingrix 2024, 2025    Tdap 2019      Colorectal Screening:    "  Last Completed Colonoscopy            COLORECTAL CANCER SCREENING (COLONOSCOPY - Every 5 Years) Tentatively due on 12/5/2028 12/05/2023  COLONOSCOPY (Done)    12/05/2023  SCANNED - COLONOSCOPY    08/23/2022  COLONOSCOPY (Done)                  Pap:    Last Completed Pap Smear       This patient has no relevant Health Maintenance data.           Mammogram:    Last Completed Mammogram            Ordered - MAMMOGRAM (Every 2 Years) Ordered on 6/3/2024      02/16/2023  SCANNED - MAMMO    02/03/2022  Done    09/03/2020  [facility] MAMMOGRAPHY                          Diet/Physical activity: Appropriate health maintenance    PHQ-2 Depression Screening  Little interest or pleasure in doing things? Not at all   Feeling down, depressed, or hopeless? Not at all   PHQ-2 Total Score 0           Objective     Physical Exam:  Vital Signs:   Vitals:    03/04/25 0932   BP: 120/68   Pulse: 69   SpO2: 97%   Weight: 101 kg (223 lb)   Height: 170.2 cm (67\")     Body mass index is 34.93 kg/m².     Physical Exam  Physical Exam  Vital Signs  Blood pressure is 120/68.    Procedures  Results  Laboratory Studies  Labs done in September 2024 showed sodium, potassium, chloride, calcium to be normal. Creatinine and GFR were normal. Liver function markers were normal. Thyroid was normal. Cholesterol numbers were in the normal range. Blood counts were normal.    Assessment / Plan      Assessment/Plan:   Diagnoses and all orders for this visit:    1. Routine general medical examination at a health care facility (Primary)    2. Primary insomnia    3. Encounter for weight management    Other orders  -     Shingrix Vaccine       Assessment & Plan  1. Routine health maintenance.  Her blood pressure readings are within the optimal range. Laboratory results from September 2024 indicate normal electrolyte levels (sodium, potassium, chloride, calcium), kidney function (creatinine and GFR), liver function markers, thyroid function, cholesterol levels, and " complete blood count. She is up to date with her mammogram and colon cancer screening, which is scheduled every 5 years. She has not undergone a hysterectomy and is uncertain about the date of her last Pap smear. Her gynecologist is Dr. Louise Puentes. She will receive her shingles vaccine today. She is advised to get a flu shot in the fall if she chooses to. No additional COVID-19 vaccinations are recommended at this time. She is advised to continue annual visits to her gynecologist. A follow-up appointment is scheduled for September 2025, during which blood work will be conducted.    2. Weight management.  She has been taking semaglutide since the last week of January and has lost 2.7 pounds in 5 weeks. She is advised to increase the dosage of semaglutide from 0.25 mg to 0.5 mg to enhance weight loss. She should monitor for any side effects such as nausea and adjust the dosage accordingly.    3. Medication management.  She occasionally takes meloxicam and uses over-the-counter diclofenac gel 1% as needed for pain relief. She also takes Allegra for allergies, Ambien for sleep, Metamucil daily, a probiotic, and a multivitamin. She is advised to continue these medications as needed. A refill for Ambien will be provided when she requests it.    Follow-up  The patient will follow up in September 2025.        Follow Up:   No follow-ups on file.    Healthcare Maintenance:   Counseling provided on appropriate diet and exercise discussed..   Jc Little voices understanding and acceptance of this advice and will call back with any further questions or concerns. AVS with preventive healthcare tips printed for patient.     Gilles Briones MD  [facility] [place] [facility]    Patient or patient representative verbalized consent for the use of Ambient Listening during the visit with  Gilles Briones MD for chart documentation. 3/4/2025  10:36 EST

## 2025-03-04 NOTE — LETTER
[facility]  Vaccine Consent Form    Patient Name:  Jc Little  Patient :  1970        Screening Checklist  The following questions should be completed prior to vaccination. If you answer “yes” to any question, it does not necessarily mean you should not be vaccinated. It just means we may need to clarify or ask more questions. If a question is unclear, please ask your healthcare provider to explain it.    Yes No   Any fever or moderate to severe illness today (mild illness and/or antibiotic treatment are not contraindications)?     Do you have a history of a serious reaction to any previous vaccinations, such as anaphylaxis, encephalopathy within 7 days, Guillain-Fountain syndrome within 6 weeks, seizure?     Have you received any live vaccine(s) (e.g MMR, MAGDALENA) or any other vaccines in the last month (to ensure duplicate doses aren't given)?     Do you have an anaphylactic allergy to latex (DTaP, DTaP-IPV, Hep A, Hep B, MenB, RV, Td, Tdap), baker’s yeast (Hep B, HPV), polysorbates (RSV, nirsevimab, PCV 20, Rotavirrus, Tdap, Shingrix), or gelatin (MAGDALENA, MMR)?     Do you have an anaphylactic allergy to neomycin (Rabies, MAGDALENA, MMR, IPV, Hep A), polymyxin B (IPV), or streptomycin (IPV)?      Any cancer, leukemia, AIDS, or other immune system disorder? (MAGDALENA, MMR, RV)     Do you have a parent, brother, or sister with an immune system problem (if immune competence of vaccine recipient clinically verified, can proceed)? (MMR, MAGDALENA)     Any recent steroid treatments for >2 weeks, chemotherapy, or radiation treatment? (MAGDALENA, MMR)     Have you received antibody-containing blood transfusions or IVIG in the past 11 months (recommended interval is dependent on product)? (MMR, MAGDALENA)     Have you taken antiviral drugs (acyclovir, famciclovir, valacyclovir for MAGDALENA) in the last 24 or 48 hours, respectively?      Are you pregnant or planning to become pregnant within 1 month? (MAGDALENA, MMR, HPV, IPV, MenB, Abrexvy; For Hep B- refer  "to Engerix-B; For RSV - Abrysvo is indicated for 32-36 weeks of pregnancy from September to January)     For infants, have you ever been told your child has had intussusception or a medical emergency involving obstruction of the intestine (Rotavirus)? If not for an infant, can skip this question.         *Ordering Physicians/APC should be consulted if \"yes\" is checked by the patient or guardian above.  I have received, read, and understand the Vaccine Information Statement (VIS) for each vaccine ordered.  I have considered my or my child's health status as well as the health status of my close contacts.  I have taken the opportunity to discuss my vaccine questions with my or my child's health care provider.   I have requested that the ordered vaccine(s) be given to me or my child.  I understand the benefits and risks of the vaccines.  I understand that I should remain in the clinic for 15 minutes after receiving the vaccine(s).  _________________________________________________________  Signature of Patient or Parent/Legal Guardian ____________________  Date     "

## 2025-06-09 ENCOUNTER — OFFICE VISIT (OUTPATIENT)
Dept: FAMILY MEDICINE CLINIC | Facility: CLINIC | Age: 55
End: 2025-06-09
Payer: COMMERCIAL

## 2025-06-09 VITALS
WEIGHT: 216 LBS | BODY MASS INDEX: 33.9 KG/M2 | DIASTOLIC BLOOD PRESSURE: 84 MMHG | HEART RATE: 76 BPM | HEIGHT: 67 IN | OXYGEN SATURATION: 98 % | SYSTOLIC BLOOD PRESSURE: 128 MMHG

## 2025-06-09 DIAGNOSIS — N95.1 MENOPAUSAL SYMPTOM: Primary | ICD-10-CM

## 2025-06-09 DIAGNOSIS — F51.01 PRIMARY INSOMNIA: ICD-10-CM

## 2025-06-09 PROCEDURE — 99214 OFFICE O/P EST MOD 30 MIN: CPT | Performed by: FAMILY MEDICINE

## 2025-06-09 RX ORDER — ZOLPIDEM TARTRATE 12.5 MG/1
12.5 TABLET, FILM COATED, EXTENDED RELEASE ORAL NIGHTLY PRN
Qty: 30 TABLET | Refills: 5 | Status: SHIPPED | OUTPATIENT
Start: 2025-06-09

## 2025-06-09 RX ORDER — ESTROGEN,CON/M-PROGEST ACET 0.3-1.5MG
1 TABLET ORAL DAILY
Qty: 30 TABLET | Refills: 2 | Status: SHIPPED | OUTPATIENT
Start: 2025-06-09

## 2025-06-09 NOTE — PROGRESS NOTES
Follow Up Office Visit      Date of Visit:  2025   Patient Name: Jc Little  : 1970   MRN: [id number]     Chief Complaint:    Chief Complaint   Patient presents with    Insomnia    Menopause       History of Present Illness: Jc Little is a 54 y.o. female who is here today for follow up.    History of Present Illness  The patient presents for a 3-month prescription refill and to discuss menopausal symptoms.    She is currently in menopause and experiences intermittent hot flashes, which are not severe enough to induce sweating and typically last for a minute. She reports weight gain since the onset of perimenopause, which she has been unable to lose. Additionally, she reports a lack of sexual desire. Her menstrual cycles ceased several years ago. She has not undergone a hysterectomy. She is currently on semaglutide 40, which she started in 2025, and has lost approximately 9 pounds. She also takes over-the-counter multivitamins, probiotics, and Metamucil daily.    She is not currently taking meloxicam. She uses an over-the-counter gel for inflammation and a cream for chest itching, the cause of which remains undetermined. She takes Ambien.    GYNECOLOGICAL HISTORY:  - Last Menstrual Period: Several years ago    SOCIAL HISTORY  She does not smoke.    FAMILY HISTORY  She is not aware of any family history of breast cancer.      Subjective      Review of Systems:   Review of Systems    Past Medical History:   Past Medical History:   Diagnosis Date    Colon polyp 2022    Diverticulitis     DX'ED BY ANT NICE THOUGH TOLD CT WAS OK    Pregnancy        Past Surgical History:   Past Surgical History:   Procedure Laterality Date    BARIATRIC SURGERY  2011    COLONOSCOPY  2023    EYE SURGERY         Family History:   Family History   Problem Relation Age of Onset    Hypertension Mother     Obesity Mother     Peripheral vascular disease Mother     Alzheimer's disease Other   "   Cancer Other     Diabetes Other        Social History:   Social History     Socioeconomic History    Marital status:    Tobacco Use    Smoking status: Former     Current packs/day: 0.00     Types: Cigarettes     Start date: 1987     Quit date: 1996     Years since quittin.1    Smokeless tobacco: Never   Vaping Use    Vaping status: Never Used   Substance and Sexual Activity    Alcohol use: Yes     Alcohol/week: 10.0 standard drinks of alcohol     Types: 10 Cans of beer per week    Drug use: Never    Sexual activity: Yes     Partners: Male     Birth control/protection: None       Medications:     Current Outpatient Medications:     zolpidem CR (Ambien CR) 12.5 MG CR tablet, Take 1 tablet by mouth At Night As Needed for Sleep., Disp: 30 tablet, Rfl: 5    estrogen, conjugated,-medroxyprogesterone (Prempro) 0.3-1.5 MG per tablet, Take 1 tablet by mouth Daily., Disp: 30 tablet, Rfl: 2    fexofenadine (Allegra Allergy) 180 MG tablet, Take 1 tablet by mouth Daily., Disp: 30 tablet, Rfl: 11    Multiple Vitamins-Minerals (HM MULTIVITAMIN ADULT GUMMY PO), , Disp: , Rfl:     Probiotic Product (PROBIOTIC DAILY PO), , Disp: , Rfl:     triamcinolone (KENALOG) 0.1 % cream, Apply 1 Application topically to the appropriate area as directed 2 (Two) Times a Day., Disp: 30 g, Rfl: 0    Allergies:   No Known Allergies    Objective     Physical Exam:  Vital Signs:   Vitals:    25 0859   BP: 128/84   Pulse: 76   SpO2: 98%   Weight: 98 kg (216 lb)   Height: 170.2 cm (67\")     Body mass index is 33.83 kg/m².     Physical Exam  Physical Exam      Procedures    Results    Assessment / Plan      Assessment/Plan:   Diagnoses and all orders for this visit:    1. Menopausal symptom (Primary)  -     FSH & LH  -     Estrogens, Total    2. Primary insomnia  -     zolpidem CR (Ambien CR) 12.5 MG CR tablet; Take 1 tablet by mouth At Night As Needed for Sleep.  Dispense: 30 tablet; Refill: 5    Other orders  -     estrogen, " conjugated,-medroxyprogesterone (Prempro) 0.3-1.5 MG per tablet; Take 1 tablet by mouth Daily.  Dispense: 30 tablet; Refill: 2       Assessment & Plan  1. Menopausal symptoms.  - Reports experiencing hot flashes, weight gain, and decreased libido.  - Hormone replacement therapy discussed as a potential treatment option.  - Prescription for Prempro provided, starting with a low dose and adjusting as necessary based on response.  - Hormone levels including FSH, LH, and total estrogen will be checked today to confirm menopausal status.    2. Medication management.  - Refill for Ambien will be sent to pharmacy.  - Advised to continue using over-the-counter allergy medication and anti-inflammatory gel as needed.  - Current regimen of semaglutide 40 mg, over-the-counter multivitamins, probiotics, and Metamucil to be continued.        Follow Up:   No follow-ups on file.    Gilles Briones  [facility] [place]     Patient or patient representative verbalized consent for the use of Ambient Listening during the visit with  Gilels Briones MD for chart documentation. 6/9/2025  09:28 EDT

## 2025-06-12 LAB
ESTROGEN SERPL-MCNC: 112 PG/ML
FSH SERPL-ACNC: 123 MIU/ML
LH SERPL-ACNC: 62.4 MIU/ML

## 2025-06-15 ENCOUNTER — RESULTS FOLLOW-UP (OUTPATIENT)
Dept: FAMILY MEDICINE CLINIC | Facility: CLINIC | Age: 55
End: 2025-06-15
Payer: COMMERCIAL

## 2025-07-31 ENCOUNTER — TELEPHONE (OUTPATIENT)
Dept: FAMILY MEDICINE CLINIC | Facility: CLINIC | Age: 55
End: 2025-07-31

## 2025-07-31 ENCOUNTER — OFFICE VISIT (OUTPATIENT)
Dept: FAMILY MEDICINE CLINIC | Facility: CLINIC | Age: 55
End: 2025-07-31
Payer: COMMERCIAL

## 2025-07-31 VITALS
SYSTOLIC BLOOD PRESSURE: 118 MMHG | OXYGEN SATURATION: 98 % | DIASTOLIC BLOOD PRESSURE: 78 MMHG | BODY MASS INDEX: 35.03 KG/M2 | HEART RATE: 83 BPM | HEIGHT: 66 IN | WEIGHT: 218 LBS

## 2025-07-31 DIAGNOSIS — Z78.0 MENOPAUSE: ICD-10-CM

## 2025-07-31 DIAGNOSIS — G89.29 CHRONIC PAIN OF RIGHT KNEE: Primary | ICD-10-CM

## 2025-07-31 DIAGNOSIS — M25.561 CHRONIC PAIN OF RIGHT KNEE: Primary | ICD-10-CM

## 2025-07-31 PROBLEM — M19.90 ARTHRITIS: Status: ACTIVE | Noted: 2025-07-31

## 2025-07-31 PROBLEM — K64.5 THROMBOSED EXTERNAL HEMORRHOIDS: Status: ACTIVE | Noted: 2019-01-20

## 2025-07-31 PROBLEM — K80.20 GALLSTONES: Status: ACTIVE | Noted: 2025-07-31

## 2025-07-31 PROCEDURE — 99213 OFFICE O/P EST LOW 20 MIN: CPT | Performed by: PHYSICIAN ASSISTANT

## 2025-07-31 RX ORDER — PREDNISONE 20 MG/1
TABLET ORAL
Qty: 9 TABLET | Refills: 0 | Status: SHIPPED | OUTPATIENT
Start: 2025-07-31

## 2025-07-31 NOTE — TELEPHONE ENCOUNTER
Spoke with patient relaying Xray initial look.  She is agreeable that we will call her back with official results when radiologist reads.

## 2025-07-31 NOTE — TELEPHONE ENCOUNTER
----- Message from Dianelys Young sent at 7/31/2025  2:40 PM EDT -----  please call patient when you get a chance and let her know that I dont se anything obvious on her xray- she may have some arthritis in the knee as well. Will wait to see what the radiologists have to say!

## 2025-07-31 NOTE — PROGRESS NOTES
.Chief Complaint  Knee Pain (Hurt knee a couple weeks ago, unsure of injury, 2 days ago stepped wrong and knee popped, was using crutch - swelling has went down but knee still not feeling right, pulling sensation in arch of foot when walking. )    Subjective          History of Present Illness  Jc Little is here today with her  History of Present Illness  The patient presents for evaluation of right knee pain.    She reports discomfort in her right knee, which was exacerbated 2 days ago due to a reinjury. Initially, she was unable to bear weight on the knee and required a crutch for mobility. Although she can now walk with a slight limp, the knee remains problematic. The initial injury occurred 2 weeks ago, the cause of which is unknown to her. She describes a sensation of tightness and pressure in both knees. She has been using Voltaren cream, an over-the-counter arthritis treatment, which provides some relief. The onset of the current pain was sudden, occurring one morning as she prepared for work after taking Ambien. She does not recall any specific incident that could have caused the injury. The pain was so severe that she could not put weight on the knee, but it gradually improved. However, the knee has remained swollen since then. She experiences pain in the front and back of the knee, as well as under the kneecap. The pain worsened again 2 days ago when she hyperextended the knee while pushing a golf cart. Since then, she has been experiencing pain primarily in the front of the knee, which has also swelled up again. She reports a sensation of pressure and tightness extending down to her foot. She reports no difficulty in flexing her knee but experiences pain when moving her ankle side to side. She also reports a popping sound in her knee when getting up or sitting down. She has been applying ice to the knee and taking ibuprofen and Tylenol for pain management.    She underwent menopause a few years ago and  "had weight loss surgery prior to that. She lost weight following the surgery but gained 30 pounds within 30 days after menopause. She had a gastric bypass 15 years ago and is concerned about taking steroids or ibuprofen due to potential stomach issues. She is interested in strength training and is considering hiring a . She had blood work done for estrogen levels during her last visit and is currently on medication for low libido. She has not noticed any improvement in her libido with estrogen She has not menstruated for 2 to 3 years and is under the care of [facility].    Social History:  Sleep: She takes Ambien to sleep.    PAST SURGICAL HISTORY:  Gastric bypass: 15 years ago    Objective   Vital Signs:   /78   Pulse 83   Ht 167.6 cm (66\")   Wt 98.9 kg (218 lb)   SpO2 98%   BMI 35.19 kg/m²     Body mass index is 35.19 kg/m².         Review of Systems      Current Outpatient Medications:   •  estrogen, conjugated,-medroxyprogesterone (Prempro) 0.3-1.5 MG per tablet, Take 1 tablet by mouth Daily., Disp: 30 tablet, Rfl: 2  •  fexofenadine (Allegra Allergy) 180 MG tablet, Take 1 tablet by mouth Daily., Disp: 30 tablet, Rfl: 11  •  Multiple Vitamins-Minerals (HM MULTIVITAMIN ADULT GUMMY PO), , Disp: , Rfl:   •  Probiotic Product (PROBIOTIC DAILY PO), , Disp: , Rfl:   •  psyllium (METAMUCIL) 58.6 % packet, Take 1 packet by mouth Daily., Disp: , Rfl:   •  triamcinolone (KENALOG) 0.1 % cream, Apply 1 Application topically to the appropriate area as directed 2 (Two) Times a Day., Disp: 30 g, Rfl: 0  •  zolpidem CR (Ambien CR) 12.5 MG CR tablet, Take 1 tablet by mouth At Night As Needed for Sleep., Disp: 30 tablet, Rfl: 5  •  predniSONE (DELTASONE) 20 MG tablet, 2 tab po qd x 3 days then 1 tab po qd x 3 days, Disp: 9 tablet, Rfl: 0    Allergies: Patient has no known allergies.    Physical Exam  Vitals and nursing note reviewed.   Constitutional:       General: She is not in " acute distress.     Appearance: Normal appearance. She is not ill-appearing, toxic-appearing or diaphoretic.   HENT:      Head: Normocephalic and atraumatic.   Pulmonary:      Effort: Pulmonary effort is normal.   Musculoskeletal:        Legs:    Skin:     General: Skin is warm.   Neurological:      Mental Status: She is alert.      Physical Exam      Result Review :          Results  Labs   - Estrogen level: 112   - FSH level: Normal   - LH level: Normal             Assessment and Plan    Diagnoses and all orders for this visit:    1. Chronic pain of right knee (Primary)  -     XR Knee 1 or 2 View Right (In Office)  - Symptoms suggest a possible meniscus issue or ligamentous problem beneath the patella, rather than an ACL injury.  - Persistent tightness could be indicative of ongoing arthritis or limited joint mobility.  - An x-ray of the right knee will be ordered today. If the x-ray does not provide sufficient information, an MRI will be considered.  - Prednisone prescribed to alleviate inflammation. Continue using Voltaren cream and engage in weightlifting exercises to strengthen the muscles around the joint. Referral to [facility] for personal training recommended.      -     predniSONE (DELTASONE) 20 MG tablet; 2 tab po qd x 3 days then 1 tab po qd x 3 days  Dispense: 9 tablet; Refill: 0  2. Menopause    Assessment & Plan      - Estrogen levels are within the normal range for menopause, and FSH and LH levels are satisfactory.  - She denies symptoms such as hot flashes, dryness, or pain during intercourse discussed.  - Advised to consult with OB/GYN for further management of menopausal symptoms.      Follow Up   No follow-ups on file.  Patient was given instructions and counseling regarding her condition or for health maintenance advice. Please see specific information pulled into the AVS if appropriate.     Patient or patient representative verbalized consent for the use of Ambient Listening  during the visit with  HAKEEM Hess for chart documentation. 7/31/2025  14:31 EDT    HAKEEM Hess  07/31/2025

## 2025-08-01 DIAGNOSIS — Z78.0 MENOPAUSE: ICD-10-CM

## 2025-08-01 RX ORDER — PREDNISONE 20 MG/1
TABLET ORAL
Qty: 9 TABLET | Refills: 0 | OUTPATIENT
Start: 2025-08-01

## 2025-08-05 DIAGNOSIS — M25.561 CHRONIC PAIN OF RIGHT KNEE: ICD-10-CM

## 2025-08-05 DIAGNOSIS — G89.29 CHRONIC PAIN OF RIGHT KNEE: ICD-10-CM

## 2025-08-05 DIAGNOSIS — M25.761 OSTEOPHYTE OF RIGHT KNEE: Primary | ICD-10-CM

## 2025-08-20 DIAGNOSIS — M25.761 OSTEOPHYTE OF RIGHT KNEE: ICD-10-CM

## 2025-08-20 DIAGNOSIS — G89.29 CHRONIC PAIN OF RIGHT KNEE: ICD-10-CM

## 2025-08-20 DIAGNOSIS — M25.561 CHRONIC PAIN OF RIGHT KNEE: ICD-10-CM

## 2025-08-22 DIAGNOSIS — F51.01 PRIMARY INSOMNIA: ICD-10-CM

## 2025-08-22 RX ORDER — ZOLPIDEM TARTRATE 12.5 MG/1
12.5 TABLET, FILM COATED, EXTENDED RELEASE ORAL NIGHTLY PRN
Qty: 30 TABLET | Refills: 0 | Status: SHIPPED | OUTPATIENT
Start: 2025-08-22

## 2025-08-26 ENCOUNTER — OFFICE VISIT (OUTPATIENT)
Dept: ORTHOPEDIC SURGERY | Facility: CLINIC | Age: 55
End: 2025-08-26
Payer: COMMERCIAL

## 2025-08-26 VITALS
BODY MASS INDEX: 35.04 KG/M2 | WEIGHT: 218.03 LBS | DIASTOLIC BLOOD PRESSURE: 82 MMHG | HEIGHT: 66 IN | SYSTOLIC BLOOD PRESSURE: 126 MMHG

## 2025-08-26 DIAGNOSIS — M17.11 PRIMARY OSTEOARTHRITIS OF RIGHT KNEE: Primary | ICD-10-CM
